# Patient Record
Sex: FEMALE | Race: WHITE
[De-identification: names, ages, dates, MRNs, and addresses within clinical notes are randomized per-mention and may not be internally consistent; named-entity substitution may affect disease eponyms.]

---

## 2019-10-02 ENCOUNTER — HOSPITAL ENCOUNTER (INPATIENT)
Dept: HOSPITAL 11 - JP.ED | Age: 64
LOS: 4 days | Discharge: HOME | DRG: 639 | End: 2019-10-06
Attending: INTERNAL MEDICINE | Admitting: FAMILY MEDICINE
Payer: MEDICARE

## 2019-10-02 DIAGNOSIS — R11.2: ICD-10-CM

## 2019-10-02 DIAGNOSIS — R53.1: ICD-10-CM

## 2019-10-02 DIAGNOSIS — E86.0: ICD-10-CM

## 2019-10-02 DIAGNOSIS — E11.10: Primary | ICD-10-CM

## 2019-10-02 DIAGNOSIS — R06.02: ICD-10-CM

## 2019-10-02 DIAGNOSIS — A08.4: ICD-10-CM

## 2019-10-02 DIAGNOSIS — N28.9: ICD-10-CM

## 2019-10-02 DIAGNOSIS — Z98.49: ICD-10-CM

## 2019-10-02 DIAGNOSIS — F41.9: ICD-10-CM

## 2019-10-02 DIAGNOSIS — Z79.4: ICD-10-CM

## 2019-10-02 PROCEDURE — 93005 ELECTROCARDIOGRAM TRACING: CPT

## 2019-10-02 PROCEDURE — 96361 HYDRATE IV INFUSION ADD-ON: CPT

## 2019-10-02 PROCEDURE — 96374 THER/PROPH/DIAG INJ IV PUSH: CPT

## 2019-10-02 PROCEDURE — 80053 COMPREHEN METABOLIC PANEL: CPT

## 2019-10-02 PROCEDURE — C9113 INJ PANTOPRAZOLE SODIUM, VIA: HCPCS

## 2019-10-02 PROCEDURE — 82800 BLOOD PH: CPT

## 2019-10-02 PROCEDURE — 96375 TX/PRO/DX INJ NEW DRUG ADDON: CPT

## 2019-10-02 PROCEDURE — 83605 ASSAY OF LACTIC ACID: CPT

## 2019-10-02 PROCEDURE — 99285 EMERGENCY DEPT VISIT HI MDM: CPT

## 2019-10-02 PROCEDURE — 85025 COMPLETE CBC W/AUTO DIFF WBC: CPT

## 2019-10-02 PROCEDURE — 36415 COLL VENOUS BLD VENIPUNCTURE: CPT

## 2019-10-02 PROCEDURE — 81001 URINALYSIS AUTO W/SCOPE: CPT

## 2019-10-02 PROCEDURE — 84484 ASSAY OF TROPONIN QUANT: CPT

## 2019-10-02 NOTE — EDM.PDOC
ED HPI GENERAL MEDICAL PROBLEM





- General


Chief Complaint: Gastrointestinal Problem


Stated Complaint: VOMTING, WEAK


Time Seen by Provider: 10/02/19 21:38


Source of Information: Reports: Patient


History Limitations: Reports: No Limitations





- History of Present Illness


INITIAL COMMENTS - FREE TEXT/NARRATIVE: 





pt arrived with a history of vomiting continuously for the pat 24 hours. She is 

very weak and shakey. She is having alot of anxiety. She did not have any loose 

stools. She did have a bm last nite that was normal colored. She is thinking 

she may have eaten something thaat caused the vomiting. She did go out to eat 

last nite. She had a popover, chicken and a salad. 


Onset: Other ( started last nite and it was continuous during the nite. )


Duration: Hour(s):


Location: Reports: Abdomen, Other ( she is not having abdomanal pain. )


Associated Symptoms: Reports: Nausea/Vomiting, Other (pt is very hyper and is 

shakey. )





- Related Data


 Allergies











Allergy/AdvReac Type Severity Reaction Status Date / Time


 


No Known Allergies Allergy   Verified 10/02/19 21:14











Home Meds: 


 Home Meds





NK [No Known Home Meds]  10/02/19 [History]











Past Medical History


HEENT History: Reports: Cataract


OB/GYN History: Reports: Pregnancy


Endocrine/Metabolic History: Reports: Other (See Below)


Other Endocrine/Metabolic History: "high blood sugar when gets sick."





- Past Surgical History


HEENT Surgical History: Reports: Cataract Surgery





Social & Family History





- Tobacco Use


Smoking Status *Q: Never Smoker





- Caffeine Use


Caffeine Use: Reports: None





- Recreational Drug Use


Recreational Drug Use: No





ED ROS GENERAL





- Review of Systems


Review Of Systems: See Below


Constitutional: Reports: Weakness, Fatigue, Diaphoresis, Other (pt has been 

vomiting)


HEENT: Reports: No Symptoms


Respiratory: Reports: Shortness of Breath, Other (pt is feeling very anxious. )


Cardiovascular: Reports: No Symptoms


Endocrine: Reports: No Symptoms


GI/Abdominal: Reports: Other (pt does not have abdomanal pain. )


: Reports: No Symptoms


Musculoskeletal: Reports: Hand Pain


Skin: Reports: No Symptoms


Neurological: Reports: No Symptoms, Other (pt is very shakey and is anxious. )





ED EXAM, GI/ABD





- Physical Exam


Exam: See Below


Text/Narrative:: 





pt arrived with a history of being very weak. She has vomited since last pm.  

She did have some abdomanal cramping at first but is not having pain  at this 

time. pt is very shakey and is feeling very weak. 


Exam Limited By: No Limitations


General Appearance: Alert, Anxious, Moderate Distress, Other (pt is shakey and 

is feeling terrible. )


Ears: Normal TMs


Nose: Normal Inspection


Throat/Mouth: Normal Inspection


Head: Atraumatic


Neck: Normal Inspection


Respiratory/Chest: No Respiratory Distress, Other (pt does have resp of 24. )


Cardiovascular: Regular Rate, Rhythm, Tachycardia


GI/Abdominal Exam: Soft, Non-Tender


 (Female) Exam: Deferred


Rectal (Female) Exam: Deferred


Back Exam: Normal Inspection


Extremities: Normal Inspection


Neurological: Alert, Oriented, Normal Cognition, Other ( very anxious 

appearing. )


Psychiatric: Anxious





Course





- Vital Signs


Last Recorded V/S: 


 Last Vital Signs











Temp  36.6 C   10/05/19 15:40


 


Pulse  76   10/05/19 15:40


 


Resp  16   10/05/19 15:40


 


BP  142/68 H  10/05/19 15:40


 


Pulse Ox  95   10/05/19 15:40














- Orders/Labs/Meds


Orders: 


 Medication Orders





Dextrose (Glutose 15)  15 gm PO ONETIME PRN


   PRN Reason: Hypoglycemia


Dextrose/Water (Dextrose 50% In Water)  50 ml IV ONETIME PRN


   PRN Reason: Hypoglycemia


Enoxaparin Sodium (Lovenox)  40 mg SUBCUT DAILY [facility]


   Last Admin: 10/05/19 09:24  Dose: 40 mg


   Admin: 10/04/19 08:16  Dose: 40 mg


   Admin: 10/03/19 10:46  Dose: 40 mg


Ceftriaxone Sodium 1 gm/ (Sodium Chloride)  50 mls @ 100 mls/hr IV Q24H [facility]


   Last Admin: 10/05/19 08:40  Dose: 100 mls/hr


Insulin Glargine (Lantus Solostar)  12 units SUBCUT BEDTIME [facility]


Insulin Human Lispro (Humalog)  0 unit SUBCUT QIDACANDBED [facility]; Protocol


Lactobacillus Rhamnosus (Culturelle)  1 cap PO BID [facility]


   Last Admin: 10/05/19 09:24  Dose: 1 cap


Lorazepam (Ativan)  0.5 mg IVPUSH Q2H PRN


   PRN Reason: Nausea


   Last Admin: 10/05/19 14:21  Dose: 0.5 mg


   Admin: 10/05/19 05:37  Dose: 0.5 mg


   Admin: 10/04/19 17:02  Dose: 0.5 mg


   Admin: 10/04/19 14:36  Dose: 0.5 mg


   Admin: 10/04/19 09:04  Dose: 0.5 mg


   Admin: 10/03/19 12:58  Dose: 0.5 mg


Metformin HCl (Glucophage)  1,000 mg PO BIDMEALS [facility]


   Last Admin: 10/05/19 17:50  Dose: 1,000 mg


Ondansetron HCl (Zofran)  4 mg IVPUSH Q4H PRN


   PRN Reason: Nausea/Vomiting


   Last Admin: 10/05/19 11:25  Dose: 4 mg


   Admin: 10/05/19 03:43  Dose: 4 mg


   Admin: 10/04/19 13:35  Dose: 4 mg


   Admin: 10/04/19 08:41  Dose: 4 mg


   Admin: 10/03/19 20:09  Dose: 4 mg


   Admin: 10/03/19 11:12  Dose: 4 mg


Pantoprazole Sodium (Protonix***)  40 mg PO BIDAC [facility]


   Last Admin: 10/05/19 17:50  Dose: 40 mg


   Admin: 10/05/19 08:22  Dose:  


   Admin: 10/04/19 16:25 Dose:  Not Given


   Admin: 10/04/19 08:15  Dose: 40 mg


   Admin: 10/03/19 16:30  Dose: 40 mg


Sucralfate (Carafate)  1 gm PO QIDACANDBED [facility]


   Last Admin: 10/05/19 17:49  Dose: 1 gm


Tamsulosin HCl (Flomax)  0.4 mg PO PCBREAKFAST [facility]


   Last Admin: 10/05/19 09:54  Dose: 0.4 mg








Labs: 


 Laboratory Tests











  10/02/19 10/02/19 10/02/19 Range/Units





  21:24 21:24 22:03 


 


WBC  14.2 H    (4.5-11.0)  K/uL


 


RBC  4.48    (3.30-5.50)  M/uL


 


Hgb  13.8    (12.0-15.0)  g/dL


 


Hct  42.3    (36.0-48.0)  %


 


MCV  94    (80-98)  fL


 


MCH  31    (27-31)  pg


 


MCHC  33    (32-36)  %


 


Plt Count  250    (150-400)  K/uL


 


Neut % (Auto)  90 H    (36-66)  %


 


Lymph % (Auto)  7 L    (24-44)  %


 


Mono % (Auto)  3    (2-6)  %


 


Eos % (Auto)  0 L    (2-4)  %


 


Baso % (Auto)  0    (0-1)  %


 


VBG pH     (7.350-7.450)  


 


Sodium   128 L   (140-148)  mmol/L


 


Potassium   4.9   (3.6-5.2)  mmol/L


 


Chloride   89 L   (100-108)  mmol/L


 


Carbon Dioxide   13 L   (21-32)  mmol/L


 


Anion Gap   30.9 H   (5.0-14.0)  mmol/L


 


BUN   51 H   (7-18)  mg/dL


 


Creatinine   1.3 H   (0.6-1.0)  mg/dL


 


Est Cr Clr Drug Dosing   43.25   mL/min


 


Estimated GFR (MDRD)   41 L   (>60)  


 


Glucose   797 H*   ()  mg/dL


 


Lactic Acid     (0.4-2.0)  mmol/L


 


Calcium   9.9   (8.5-10.1)  mg/dL


 


Total Bilirubin   0.9   (0.2-1.0)  mg/dL


 


AST   8 L   (15-37)  U/L


 


ALT   14   (12-78)  U/L


 


Alkaline Phosphatase   94   ()  U/L


 


Troponin I    < 0.017  (0.000-0.056)  ng/mL


 


Total Protein   7.8   (6.4-8.2)  g/dL


 


Albumin   3.5   (3.4-5.0)  g/dL


 


Globulin   4.3 H   (2.3-3.5)  g/dL


 


Albumin/Globulin Ratio   0.8 L   (1.2-2.2)  


 


Urine Color     (YELLOW)  


 


Urine Appearance     (CLEAR)  


 


Urine pH     (5.0-8.0)  


 


Ur Specific Gravity     (1.008-1.030)  


 


Urine Protein     (NEGATIVE)  mg/dL


 


Urine Glucose (UA)     (NEGATIVE)  mg/dL


 


Urine Ketones     (NEGATIVE)  mg/dL


 


Urine Occult Blood     (NEGATIVE)  


 


Urine Nitrite     (NEGATIVE)  


 


Urine Bilirubin     (NEGATIVE)  


 


Urine Urobilinogen     (0.2-1.0)  EU/dL


 


Ur Leukocyte Esterase     (NEGATIVE)  


 


Urine RBC     (0-5)  


 


Urine WBC     (0-5)  


 


Ur Epithelial Cells     


 


Amorphous Sediment     


 


Urine Bacteria     


 


Urine Mucus     














  10/02/19 10/02/19 10/02/19 Range/Units





  22:08 22:10 22:20 


 


WBC     (4.5-11.0)  K/uL


 


RBC     (3.30-5.50)  M/uL


 


Hgb     (12.0-15.0)  g/dL


 


Hct     (36.0-48.0)  %


 


MCV     (80-98)  fL


 


MCH     (27-31)  pg


 


MCHC     (32-36)  %


 


Plt Count     (150-400)  K/uL


 


Neut % (Auto)     (36-66)  %


 


Lymph % (Auto)     (24-44)  %


 


Mono % (Auto)     (2-6)  %


 


Eos % (Auto)     (2-4)  %


 


Baso % (Auto)     (0-1)  %


 


VBG pH   7.236 L   (7.350-7.450)  


 


Sodium     (140-148)  mmol/L


 


Potassium     (3.6-5.2)  mmol/L


 


Chloride     (100-108)  mmol/L


 


Carbon Dioxide     (21-32)  mmol/L


 


Anion Gap     (5.0-14.0)  mmol/L


 


BUN     (7-18)  mg/dL


 


Creatinine     (0.6-1.0)  mg/dL


 


Est Cr Clr Drug Dosing     mL/min


 


Estimated GFR (MDRD)     (>60)  


 


Glucose     ()  mg/dL


 


Lactic Acid    3.6 H  (0.4-2.0)  mmol/L


 


Calcium     (8.5-10.1)  mg/dL


 


Total Bilirubin     (0.2-1.0)  mg/dL


 


AST     (15-37)  U/L


 


ALT     (12-78)  U/L


 


Alkaline Phosphatase     ()  U/L


 


Troponin I     (0.000-0.056)  ng/mL


 


Total Protein     (6.4-8.2)  g/dL


 


Albumin     (3.4-5.0)  g/dL


 


Globulin     (2.3-3.5)  g/dL


 


Albumin/Globulin Ratio     (1.2-2.2)  


 


Urine Color  Yellow    (YELLOW)  


 


Urine Appearance  Cloudy A    (CLEAR)  


 


Urine pH  5.0    (5.0-8.0)  


 


Ur Specific Gravity  1.010    (1.008-1.030)  


 


Urine Protein  Negative    (NEGATIVE)  mg/dL


 


Urine Glucose (UA)  500 H    (NEGATIVE)  mg/dL


 


Urine Ketones  80 H    (NEGATIVE)  mg/dL


 


Urine Occult Blood  Small H    (NEGATIVE)  


 


Urine Nitrite  Negative    (NEGATIVE)  


 


Urine Bilirubin  Small H    (NEGATIVE)  


 


Urine Urobilinogen  0.2    (0.2-1.0)  EU/dL


 


Ur Leukocyte Esterase  Negative    (NEGATIVE)  


 


Urine RBC  0-5    (0-5)  


 


Urine WBC  0-5    (0-5)  


 


Ur Epithelial Cells  Moderate    


 


Amorphous Sediment  Few    


 


Urine Bacteria  Few    


 


Urine Mucus  Few    











Meds: 


Medications











Generic Name Dose Route Start Last Admin





  Trade Name Freq  PRN Reason Stop Dose Admin


 


Dextrose  15 gm  10/03/19 08:50  





  Glutose 15  PO   





  ONETIME PRN   





  Hypoglycemia   





     





     





     


 


Dextrose/Water  50 ml  10/03/19 08:50  





  Dextrose 50% In Water  IV   





  ONETIME PRN   





  Hypoglycemia   





     





     





     


 


Enoxaparin Sodium  40 mg  10/03/19 10:00  10/05/19 09:24





  Lovenox  SUBCUT   40 mg





  DAILY IES[facility]   Administration





     





     





     





     


 


Ceftriaxone Sodium 1 gm/  50 mls @ 100 mls/hr  10/05/19 09:00  10/05/19 08:40





  Sodium Chloride  IV   100 mls/hr





  Q24H [facility]   Administration





     





     





     





     


 


Insulin Glargine  12 units  10/05/19 21:00  





  Lantus Solostar  SUBCUT   





  BEDTIME [facility]   





     





     





     





     


 


Insulin Human Lispro  0 unit  10/05/19 17:00  





  Humalog  SUBCUT   





  QIDACANDBED [facility]   





     





     





  Protocol   





     


 


Lactobacillus Rhamnosus  1 cap  10/05/19 09:00  10/05/19 09:24





  Culturelle  PO   1 cap





  BID IESHA   Administration





     





     





     





     


 


Lorazepam  0.5 mg  10/03/19 12:50  10/05/19 14:21





  Ativan  IVPUSH   0.5 mg





  Q2H PRN   Administration





  Nausea   





     





     





     


 


Metformin HCl  1,000 mg  10/05/19 17:00  10/05/19 17:50





  Glucophage  PO   1,000 mg





  BIDMEALS IESHA   Administration





     





     





     





     


 


Ondansetron HCl  4 mg  10/03/19 10:58  10/05/19 11:25





  Zofran  IVPUSH   4 mg





  Q4H PRN   Administration





  Nausea/Vomiting   





     





     





     


 


Pantoprazole Sodium  40 mg  10/03/19 16:30  10/05/19 17:50





  Protonix***  PO   40 mg





  BIDAC IESHA   Administration





     





     





     





     


 


Sucralfate  1 gm  10/05/19 17:00  10/05/19 17:49





  Carafate  PO   1 gm





  QIDACANDBED I[facility]   Administration





     





     





     





     


 


Tamsulosin HCl  0.4 mg  10/05/19 09:00  10/05/19 09:54





  Flomax  PO   0.4 mg





  PCBREAKFAST I[facility]   Administration





     





     





     





     














Discontinued Medications














Generic Name Dose Route Start Last Admin





  Trade Name Shira  PRN Reason Stop Dose Admin


 


Sodium Chloride  1,000 mls @ 999 mls/hr  10/02/19 21:45  10/03/19 04:02





  Normal Saline  IV   999 mls/hr





  ASDIRECTED IESHA   Administration





     





     





     





     


 


Insulin Human Regular 100 unit  100 mls @ 6.35 mls/hr  10/02/19 22:15  10/03/19 

05:34





  / Sodium Chloride  IV   Infused





  TITRATE IESHA   Titration





     





     





  Protocol   





  0.1 UNITS/KG/HR   


 


Sodium Chloride  1,000 mls @ 999 mls/hr  10/02/19 22:15  10/02/19 23:06





  Normal Saline  IV   999 mls/hr





  ASDIRECTED IESHA   Administration





     





     





     





     


 


Dextrose/Sodium Chloride  1,000 mls @ 100 mls/hr  10/03/19 06:15  10/03/19 15:04





  Dextrose 5%-Normal Saline  IV   100 mls/hr





  ASDIRECTED IESHA   Administration





     





     





     





     


 


Sodium Chloride  1,000 mls @ 150 mls/hr  10/03/19 18:15  10/04/19 08:46





  Normal Saline  IV   150 mls/hr





  ASDIRECTED IESHA   Administration





     





     





     





     


 


Sodium Chloride  1,000 mls @ 50 mls/hr  10/04/19 10:30  10/05/19 00:59





  Normal Saline  IV   50 mls/hr





  ASDIRECTED IESHA   Administration





     





     





     





     


 


Sodium Chloride  74 mls @ 3 mls/sec  10/04/19 10:45  10/04/19 10:53





  Normal Saline  IV  10/04/19 10:46  3 mls/sec





  ASDIRECTED IESHA   Administration





     





     





     





     


 


Potassium Chloride 20 meq/  0 mls @ 50 mls/hr  10/05/19 06:30  10/05/19 06:23





  Premix  IV  10/05/19 08:15  50 mls/hr





  Q2H IESHA   Administration





     





     





     





     


 


Potassium Chloride 20 meq/  112 mls @ 56 mls/hr  10/05/19 08:30  10/05/19 09:06





Lidocaine HCl 2 ml/ Sodium  IV  10/05/19 10:29  56 mls/hr





Chloride  ONETIME ONE   Administration





     





     





     





     


 


Insulin Glargine  20 units  10/03/19 21:00  10/04/19 21:38





  Lantus Solostar  SUBCUT   20 units





  BEDTIME [facility]   Administration





     





     





     





     


 


Insulin Human Lispro  0 unit  10/03/19 11:00  10/03/19 16:43





  Humalog  SUBCUT   6 units





  QIDACANDBED I[facility]HA   Administration





     





     





  Protocol   





     


 


Insulin Human Lispro  0 unit  10/03/19 18:00  10/05/19 14:46





  Humalog  SUBCUT   Not Given





  Q4H [facility]   





     





     





  Protocol   





     


 


Insulin Human Regular  4 unit  10/02/19 22:11  10/02/19 22:38





  Humulin R  IVPUSH  10/02/19 22:12  4 units





  ONETIME ONE   Administration





     





     





     





     


 


Iopamidol  100 ml  10/04/19 10:31  10/04/19 10:52





  Isovue-300 (61%)  IV  10/05/19 10:32  100 ml





  .AS DIRECTED PRN   Administration





  RADIOLOGY EXAM   





     





     





     


 


Lidocaine HCl  5 ml  10/05/19 06:08  10/05/19 06:23





  Xylocaine-Mpf 1%  INJECT  10/05/19 06:09  5 ml





  ONETIME ONE   Administration





     





     





     





     


 


Lorazepam  0.5 mg  10/02/19 21:42  10/02/19 22:01





  Ativan  IVPUSH  10/02/19 21:43  0.5 mg





  ONETIME ONE   Administration





     





     





     





     


 


Lorazepam  0.5 mg  10/04/19 10:09  10/04/19 10:16





  Ativan  IVPUSH  10/04/19 10:10  0.5 mg





  ONETIME ONE   Administration





     





     





     





     


 


Metformin HCl  1,000 mg  10/03/19 09:30  10/04/19 08:16





  Glucophage  PO   1,000 mg





  BIDMEALS IESHA   Administration





     





     





     





     


 


Ondansetron HCl  4 mg  10/02/19 21:37  10/02/19 22:05





  Zofran  IVPUSH  10/02/19 21:38  4 mg





  ONETIME ONE   Administration





     





     





     





     


 


Pantoprazole Sodium  40 mg  10/02/19 22:31  10/02/19 22:54





  Protonix Iv***  IVPUSH  10/02/19 22:32  40 mg





  ONETIME ONE   Administration





     





     





     





     


 


Pantoprazole Sodium  40 mg  10/02/19 23:00  10/02/19 23:21





  Protonix Iv***  IV   Not Given





  Q12H [facility]   





     





     





     





     


 


Potassium Chloride  40 meq  10/03/19 05:08  10/03/19 05:25





  Klor-Con M20  PO  10/03/19 05:09  40 meq





  ONETIME ONE   Administration





     





     





     





     


 


Potassium Chloride  40 meq  10/04/19 10:00  10/04/19 11:14





  Klor-Con M20  PO  10/04/19 10:01  40 meq





  ONETIME ONE   Administration





     





     





     





     














- Re-Assessments/Exams


Free Text/Narrative Re-Assessment/Exam: 





10/02/19 22:30


pt has a bs of 797 and she has a co2 of 12.  Her bun and creatnine are 

elevated. Her emesis was dark looking according to her. b





Departure





- Departure


Time of Disposition: 22:32


Disposition: Admitted As Inpatient 65


Condition: Fair


Clinical Impression: 


 Dehydration, Renal insufficiency





Diabetic keto-acidosis


Qualifiers:


 Diabetes mellitus type: type 2 Diabetes mellitus complication detail: without 

coma Qualified Code(s): E11.10 - Type 2 diabetes mellitus with ketoacidosis 

without coma








- Discharge Information

## 2019-10-02 NOTE — PCM.HP.2
H&P History of Present Illness





- General


Date of Service: 10/02/19


Admit Problem/Dx: 


 Admission Diagnosis/Problem





Admission Diagnosis/Problem      Ketoacidosis in diabetes mellitus








Source of Information: Patient, Family


History Limitations: Reports: No Limitations





- History of Present Illness


Initial Comments - Free Text/Narative: 





Patient is a 63yo female with PMH of DMII that she controls normally with diet 

and exercise. She says she has been on metformin in the past but 'weans herself 

off' over time. She says she normally has blood sugars in the 110-120 range but 

when she gets sick her BG rises. Her  and daughter believe that her BG 

rises and then she gets sick, but she disputes that her BG was high. She says 

she has no other medical problems and denies that she should be on medication 

for anything other than diabetes. She says she started having weakness and 

fatigue probably 3-5  days ago and that it worsened acutely yesterday with 

nausea and vomiting that started after going out for dinner yesterday. She says 

she started to be able to keep water down today but was feeling so ill her 

family brought her to the ER. She was found to have DKA in the ED


Onset of Symptoms: Reports: Gradual


Duration of Symptoms: Reports: Day(s):, Getting Worse


Location: Reports: Generalized





- Related Data


Allergies/Adverse Reactions: 


 Allergies











Allergy/AdvReac Type Severity Reaction Status Date / Time


 


No Known Allergies Allergy   Verified 10/02/19 21:14











Home Medications: 


 Home Meds





NK [No Known Home Meds]  10/02/19 [History]











Past Medical History


HEENT History: Reports: Cataract


OB/GYN History: Reports: Pregnancy


Endocrine/Metabolic History: Reports: Diabetes, Type II, Other (See Below)


Other Endocrine/Metabolic History: "high blood sugar when gets sick."





- Past Surgical History


HEENT Surgical History: Reports: Cataract Surgery





Social & Family History





- Family History


Family Medical History: Noncontributory





- Tobacco Use


Smoking Status *Q: Never Smoker





- Caffeine Use


Caffeine Use: Reports: None





- Recreational Drug Use


Recreational Drug Use: No





H&P Review of Systems





- Review of Systems:


Review Of Systems: See Below


General: Reports: Weakness, Fatigue, Diaphoresis


HEENT: Reports: Headaches


Pulmonary: Reports: No Symptoms


Cardiovascular: Reports: No Symptoms


Gastrointestinal: Reports: Nausea, Vomiting


Genitourinary: Reports: Frequency


Musculoskeletal: Reports: Other (weakness)


Skin: Reports: No Symptoms


Psychiatric: Reports: No Symptoms


Neurological: Reports: Dizziness, Weakness





Exam





- Exam


Exam: See Below





- Vital Signs


Vital Signs: 


 Last Vital Signs











Temp  36.6 C   10/02/19 21:12


 


Pulse  80   10/02/19 21:12


 


Resp  24 H  10/02/19 21:12


 


BP  96/56 L  10/02/19 21:12


 


Pulse Ox  97   10/02/19 21:12











Weight: 63.503 kg





- Exam


General: Alert, Oriented, Cooperative, Mild Distress


HEENT: PERRLA, Conjunctiva Clear


Neck: Supple, Trachea Midline


Lungs: Clear to Auscultation, Normal Respiratory Effort


Cardiovascular: Regular Rate, Regular Rhythm


GI/Abdominal Exam: Normal Bowel Sounds, Soft, Non-Tender, No Organomegaly, No 

Distention, No Abnormal Bruit, No Mass


 (Female) Exam: Deferred


Rectal (Female) Exam: Deferred


Back Exam: Normal Inspection


Extremities: Normal Inspection


Skin: Warm, Dry, Intact


Neurological: Cranial Nerves Intact


Neuro Extensive - Mental Status: Alert, Oriented x3, Normal Mood/Affect, Normal 

Cognition, Memory Intact


Neuro Extensive - Motor, Sensory, Reflexes: CN II-XII Intact, Normal Gait, 

Normal Reflexes


Psychiatric: Alert, Normal Affect, Normal Mood





- Patient Data


Lab Results Last 24 hrs: 


 Laboratory Results - last 24 hr











  10/02/19 10/02/19 10/02/19 Range/Units





  21:24 21:24 22:03 


 


WBC  14.2 H    (4.5-11.0)  K/uL


 


RBC  4.48    (3.30-5.50)  M/uL


 


Hgb  13.8    (12.0-15.0)  g/dL


 


Hct  42.3    (36.0-48.0)  %


 


MCV  94    (80-98)  fL


 


MCH  31    (27-31)  pg


 


MCHC  33    (32-36)  %


 


Plt Count  250    (150-400)  K/uL


 


Neut % (Auto)  90 H    (36-66)  %


 


Lymph % (Auto)  7 L    (24-44)  %


 


Mono % (Auto)  3    (2-6)  %


 


Eos % (Auto)  0 L    (2-4)  %


 


Baso % (Auto)  0    (0-1)  %


 


VBG pH     (7.350-7.450)  


 


Sodium   128 L   (140-148)  mmol/L


 


Potassium   4.9   (3.6-5.2)  mmol/L


 


Chloride   89 L   (100-108)  mmol/L


 


Carbon Dioxide   13 L   (21-32)  mmol/L


 


Anion Gap   30.9 H   (5.0-14.0)  mmol/L


 


BUN   51 H   (7-18)  mg/dL


 


Creatinine   1.3 H   (0.6-1.0)  mg/dL


 


Est Cr Clr Drug Dosing   43.25   mL/min


 


Estimated GFR (MDRD)   41 L   (>60)  


 


Glucose   797 H*   ()  mg/dL


 


Lactic Acid     (0.4-2.0)  mmol/L


 


Calcium   9.9   (8.5-10.1)  mg/dL


 


Total Bilirubin   0.9   (0.2-1.0)  mg/dL


 


AST   8 L   (15-37)  U/L


 


ALT   14   (12-78)  U/L


 


Alkaline Phosphatase   94   ()  U/L


 


Troponin I    < 0.017  (0.000-0.056)  ng/mL


 


Total Protein   7.8   (6.4-8.2)  g/dL


 


Albumin   3.5   (3.4-5.0)  g/dL


 


Globulin   4.3 H   (2.3-3.5)  g/dL


 


Albumin/Globulin Ratio   0.8 L   (1.2-2.2)  


 


Urine Color     (YELLOW)  


 


Urine Appearance     (CLEAR)  


 


Urine pH     (5.0-8.0)  


 


Ur Specific Gravity     (1.008-1.030)  


 


Urine Protein     (NEGATIVE)  mg/dL


 


Urine Glucose (UA)     (NEGATIVE)  mg/dL


 


Urine Ketones     (NEGATIVE)  mg/dL


 


Urine Occult Blood     (NEGATIVE)  


 


Urine Nitrite     (NEGATIVE)  


 


Urine Bilirubin     (NEGATIVE)  


 


Urine Urobilinogen     (0.2-1.0)  EU/dL


 


Ur Leukocyte Esterase     (NEGATIVE)  


 


Urine RBC     (0-5)  


 


Urine WBC     (0-5)  


 


Ur Epithelial Cells     


 


Amorphous Sediment     


 


Urine Bacteria     


 


Urine Mucus     














  10/02/19 10/02/19 10/02/19 Range/Units





  22:08 22:10 22:20 


 


WBC     (4.5-11.0)  K/uL


 


RBC     (3.30-5.50)  M/uL


 


Hgb     (12.0-15.0)  g/dL


 


Hct     (36.0-48.0)  %


 


MCV     (80-98)  fL


 


MCH     (27-31)  pg


 


MCHC     (32-36)  %


 


Plt Count     (150-400)  K/uL


 


Neut % (Auto)     (36-66)  %


 


Lymph % (Auto)     (24-44)  %


 


Mono % (Auto)     (2-6)  %


 


Eos % (Auto)     (2-4)  %


 


Baso % (Auto)     (0-1)  %


 


VBG pH   7.236 L   (7.350-7.450)  


 


Sodium     (140-148)  mmol/L


 


Potassium     (3.6-5.2)  mmol/L


 


Chloride     (100-108)  mmol/L


 


Carbon Dioxide     (21-32)  mmol/L


 


Anion Gap     (5.0-14.0)  mmol/L


 


BUN     (7-18)  mg/dL


 


Creatinine     (0.6-1.0)  mg/dL


 


Est Cr Clr Drug Dosing     mL/min


 


Estimated GFR (MDRD)     (>60)  


 


Glucose     ()  mg/dL


 


Lactic Acid    3.6 H  (0.4-2.0)  mmol/L


 


Calcium     (8.5-10.1)  mg/dL


 


Total Bilirubin     (0.2-1.0)  mg/dL


 


AST     (15-37)  U/L


 


ALT     (12-78)  U/L


 


Alkaline Phosphatase     ()  U/L


 


Troponin I     (0.000-0.056)  ng/mL


 


Total Protein     (6.4-8.2)  g/dL


 


Albumin     (3.4-5.0)  g/dL


 


Globulin     (2.3-3.5)  g/dL


 


Albumin/Globulin Ratio     (1.2-2.2)  


 


Urine Color  Yellow    (YELLOW)  


 


Urine Appearance  Cloudy A    (CLEAR)  


 


Urine pH  5.0    (5.0-8.0)  


 


Ur Specific Gravity  1.010    (1.008-1.030)  


 


Urine Protein  Negative    (NEGATIVE)  mg/dL


 


Urine Glucose (UA)  500 H    (NEGATIVE)  mg/dL


 


Urine Ketones  80 H    (NEGATIVE)  mg/dL


 


Urine Occult Blood  Small H    (NEGATIVE)  


 


Urine Nitrite  Negative    (NEGATIVE)  


 


Urine Bilirubin  Small H    (NEGATIVE)  


 


Urine Urobilinogen  0.2    (0.2-1.0)  EU/dL


 


Ur Leukocyte Esterase  Negative    (NEGATIVE)  


 


Urine RBC  0-5    (0-5)  


 


Urine WBC  0-5    (0-5)  


 


Ur Epithelial Cells  Moderate    


 


Amorphous Sediment  Few    


 


Urine Bacteria  Few    


 


Urine Mucus  Few    











Result Diagrams: 


 10/02/19 21:24





 10/02/19 21:24





EKG INTERPRETATION


EKG Date: 10/02/19


Time: 22:28


Rhythm: NSR


Rate (Beats/Min): 81


Axis: Normal


P-Wave: Present


QRS: Normal


ST-T: Normal


QT: Normal


Comparison: NA - No Prior EKG


EKG Interpretation Comments: 





Normal EKG 





*Q Meaningful Use (ADM)





- VTE Risk Assess *Q


Each Risk Factor Represents 1 Point: None


Total Score 1 Point Risk Factors: 0


Each Risk Factor Represents 2 Points: Age 60 - 74 Years


Total Score 2 Point Risk Factors: 2





- Problem List


(1) Diabetic keto-acidosis


SNOMED Code(s): 100863240, 859471753


   ICD Code: E11.10 - TYPE 2 DIABETES MELLITUS WITH KETOACIDOSIS WITHOUT COMA   

Status: Acute   Current Visit: Yes   Onset Date: ~10/01/19   


Qualifiers: 


   Diabetes mellitus type: type 2   Diabetes mellitus complication detail: 

without coma   Qualified Code(s): E11.10 - Type 2 diabetes mellitus with 

ketoacidosis without coma   





(2) Dehydration


SNOMED Code(s): 22775453


   ICD Code: E86.0 - DEHYDRATION   Status: Acute   Current Visit: Yes   Onset 

Date: ~10/01/19   





(3) Renal insufficiency


SNOMED Code(s): 827118482, 868329836


   ICD Code: N28.9 - DISORDER OF KIDNEY AND URETER, UNSPECIFIED   Status: Acute

   Current Visit: Yes   Onset Date: ~10/01/19   


Problem List Initiated/Reviewed/Updated: Yes


Orders Last 24hrs: 


 Active Orders 24 hr











 Category Date Time Status


 


 Patient Status Manage Transfer [TRANSFER] Routine ADT  10/02/19 23:03 Active


 


 Patient Status [ADT] Routine ADT  10/02/19 22:54 Active


 


 Cardiac Monitoring [RC] CONTINUOUS Care  10/02/19 22:54 Active


 


 EKG Documentation Completion [RC] ASDIRECTED Care  10/02/19 22:03 Active


 


 Intake and Output [RC] QSHIFT Care  10/02/19 22:54 Active


 


 May Shower [RC] ASDIRECTED Care  10/02/19 22:53 Active


 


 Oxygen Therapy [RC] PRN Care  10/02/19 22:54 Active


 


 Up With Assistance [RC] ASDIRECTED Care  10/02/19 22:53 Active


 


 VTE/DVT Education [RC] Per Unit Routine Care  10/02/19 22:54 Active


 


 Vital Signs [RC] Q4H Care  10/02/19 22:54 Active


 


 Insulin Regular, Human [HumuLIN R] 100 unit Med  10/02/19 22:15 Active





 Sodium Chloride 0.9% [Normal Saline] 99 ml   





 IV TITRATE   


 


 Pantoprazole [ProTONIX IV***] Med  10/02/19 23:00 Ordered





 40 mg IV Q12H   


 


 Sodium Chloride 0.9% [Normal Saline] 1,000 ml Med  10/02/19 21:45 Active





 IV ASDIRECTED   


 


 Sodium Chloride 0.9% [Normal Saline] 1,000 ml Med  10/02/19 22:15 Active





 IV ASDIRECTED   


 


 Sequential Compression Device [OM.PC] Per Unit Routine Oth  10/02/19 22:55 

Ordered


 


 Resuscitation Status Routine Resus Stat  10/02/19 22:53 Ordered


 


 EKG 12 Lead [EK] Routine Ther  10/02/19 22:03 Ordered








 Medication Orders





Sodium Chloride (Normal Saline)  1,000 mls @ 999 mls/hr IV ASDIRECTED IESHA


   Last Admin: 10/02/19 21:59  Dose: 999 mls/hr


Insulin Human Regular 100 unit (/ Sodium Chloride)  100 mls @ 6.35 mls/hr IV 

TITRATE IESHA; Protocol


   Last Admin: 10/02/19 23:01  Dose: 0.1 units/kg/hr, 6.35 mls/hr


Sodium Chloride (Normal Saline)  1,000 mls @ 999 mls/hr IV ASDIRECTED IESHA


   Last Admin: 10/02/19 23:06  Dose: 999 mls/hr


Pantoprazole Sodium (Protonix Iv***)  40 mg IV Q12H IESHA








Assessment/Plan Comment:: 





Will titrate BG down to 250, will repeat BG every 1-2hrs, will repeat lactate, 

BMP, VBG in 3-4hrs. Once blood glucose reaches 250 stop drip, and transition to 

SQ insulin per sliding scale, and switch NS to 1/2NS D5 until patient is able 

to take PO with BG 1-4hr as needed per protocol. 





- Mortality Measure


Prognosis:: Good

## 2019-10-03 LAB — HBA1C BLD-MCNC: 14.8 % (ref 4.5–6.2)

## 2019-10-03 RX ADMIN — ONDANSETRON PRN MG: 2 INJECTION, SOLUTION INTRAMUSCULAR; INTRAVENOUS at 11:12

## 2019-10-03 RX ADMIN — INSULIN GLARGINE SCH UNITS: 100 INJECTION, SOLUTION SUBCUTANEOUS at 21:47

## 2019-10-03 RX ADMIN — INSULIN LISPRO SCH UNITS: 100 INJECTION, SOLUTION INTRAVENOUS; SUBCUTANEOUS at 16:43

## 2019-10-03 RX ADMIN — INSULIN LISPRO SCH: 100 INJECTION, SOLUTION INTRAVENOUS; SUBCUTANEOUS at 19:28

## 2019-10-03 RX ADMIN — INSULIN LISPRO SCH UNITS: 100 INJECTION, SOLUTION INTRAVENOUS; SUBCUTANEOUS at 11:44

## 2019-10-03 RX ADMIN — ONDANSETRON PRN MG: 2 INJECTION, SOLUTION INTRAMUSCULAR; INTRAVENOUS at 20:09

## 2019-10-03 RX ADMIN — INSULIN LISPRO SCH UNITS: 100 INJECTION, SOLUTION INTRAVENOUS; SUBCUTANEOUS at 21:48

## 2019-10-03 RX ADMIN — LORAZEPAM PRN MG: 2 INJECTION INTRAMUSCULAR; INTRAVENOUS at 12:58

## 2019-10-03 NOTE — PCM.PN
- General Info


Date of Service: 10/03/19


Subjective Update: 





Ms. Nogueira is a 64-year-old woman who was admitted through the emergency 

department last night by Dr. Garnett. She presented with nausea, vomiting, 

weakness, secondary to marked hyperglycemia and diabetic ketoacidosis. She has 

a long-standing history of type 2 diabetes mellitus with poor control. 

Previously treated with metformin and insulin, which she has not taken for some 

time. HGB A1c this morning found to be markedly elevated at 14.8. She is been 

treated with IV insulin through the night, anion gap has almost normalized and 

glucose levels are under good control. She feels significantly improved with no 

further nausea or vomiting.


Functional Status: Reports: Tolerating Diet, Urinating





- Review of Systems


General: Reports: Weakness.  Denies: Fever, Chills


Pulmonary: Reports: No Symptoms


Cardiovascular: Reports: No Symptoms


Gastrointestinal: Reports: No Symptoms





- Patient Data


Vitals - Most Recent: 


 Last Vital Signs











Temp  97.8 F   10/02/19 21:12


 


Pulse  82   10/03/19 02:41


 


Resp  14   10/03/19 02:41


 


BP  91/48 L  10/03/19 02:41


 


Pulse Ox  96   10/03/19 02:41











Weight - Most Recent: 163 lb 15.995 oz


I&O - Last 24 Hours: 


 Intake & Output











 10/02/19 10/03/19 10/03/19





 22:59 06:59 14:59


 


Intake Total  5786 


 


Balance  5786 











Lab Results Last 24 Hours: 


 Laboratory Results - last 24 hr











  10/02/19 10/02/19 10/02/19 Range/Units





  21:24 21:24 22:03 


 


WBC  14.2 H    (4.5-11.0)  K/uL


 


RBC  4.48    (3.30-5.50)  M/uL


 


Hgb  13.8    (12.0-15.0)  g/dL


 


Hct  42.3    (36.0-48.0)  %


 


MCV  94    (80-98)  fL


 


MCH  31    (27-31)  pg


 


MCHC  33    (32-36)  %


 


Plt Count  250    (150-400)  K/uL


 


Neut % (Auto)  90 H    (36-66)  %


 


Lymph % (Auto)  7 L    (24-44)  %


 


Mono % (Auto)  3    (2-6)  %


 


Eos % (Auto)  0 L    (2-4)  %


 


Baso % (Auto)  0    (0-1)  %


 


ABG Hemoglobin     (12.0-16.0)  g/dL


 


ABG Oxyhemoglobin     %


 


ABG Carboxyhemoglobin     (0.0-1.6)  %


 


ABG Methemoglobin     %


 


VBG pH     (7.350-7.450)  


 


VBG pCO2     mm/Hg


 


VBG pO2     mm/Hg


 


VBG HCO3     mmol/L


 


VBG Total CO2     mmol/L


 


VBG O2 Saturation     


 


VBG O2 Content     %vol


 


VBG Base Excess     mm/L


 


O2 Delivery Device     


 


Sodium   128 L   (140-148)  mmol/L


 


Potassium   4.9   (3.6-5.2)  mmol/L


 


Chloride   89 L   (100-108)  mmol/L


 


Carbon Dioxide   13 L   (21-32)  mmol/L


 


Anion Gap   30.9 H   (5.0-14.0)  mmol/L


 


BUN   51 H   (7-18)  mg/dL


 


Creatinine   1.3 H   (0.6-1.0)  mg/dL


 


Est Cr Clr Drug Dosing   43.25   mL/min


 


Estimated GFR (MDRD)   41 L   (>60)  


 


Glucose   797 H*   ()  mg/dL


 


Hemoglobin A1c     (4.5-6.2)  %


 


Lactic Acid     (0.4-2.0)  mmol/L


 


Calcium   9.9   (8.5-10.1)  mg/dL


 


Total Bilirubin   0.9   (0.2-1.0)  mg/dL


 


AST   8 L   (15-37)  U/L


 


ALT   14   (12-78)  U/L


 


Alkaline Phosphatase   94   ()  U/L


 


Troponin I    < 0.017  (0.000-0.056)  ng/mL


 


Total Protein   7.8   (6.4-8.2)  g/dL


 


Albumin   3.5   (3.4-5.0)  g/dL


 


Globulin   4.3 H   (2.3-3.5)  g/dL


 


Albumin/Globulin Ratio   0.8 L   (1.2-2.2)  


 


Urine Color     (YELLOW)  


 


Urine Appearance     (CLEAR)  


 


Urine pH     (5.0-8.0)  


 


Ur Specific Gravity     (1.008-1.030)  


 


Urine Protein     (NEGATIVE)  mg/dL


 


Urine Glucose (UA)     (NEGATIVE)  mg/dL


 


Urine Ketones     (NEGATIVE)  mg/dL


 


Urine Occult Blood     (NEGATIVE)  


 


Urine Nitrite     (NEGATIVE)  


 


Urine Bilirubin     (NEGATIVE)  


 


Urine Urobilinogen     (0.2-1.0)  EU/dL


 


Ur Leukocyte Esterase     (NEGATIVE)  


 


Urine RBC     (0-5)  


 


Urine WBC     (0-5)  


 


Ur Epithelial Cells     


 


Amorphous Sediment     


 


Urine Bacteria     


 


Urine Mucus     














  10/02/19 10/02/19 10/02/19 Range/Units





  22:08 22:10 22:20 


 


WBC     (4.5-11.0)  K/uL


 


RBC     (3.30-5.50)  M/uL


 


Hgb     (12.0-15.0)  g/dL


 


Hct     (36.0-48.0)  %


 


MCV     (80-98)  fL


 


MCH     (27-31)  pg


 


MCHC     (32-36)  %


 


Plt Count     (150-400)  K/uL


 


Neut % (Auto)     (36-66)  %


 


Lymph % (Auto)     (24-44)  %


 


Mono % (Auto)     (2-6)  %


 


Eos % (Auto)     (2-4)  %


 


Baso % (Auto)     (0-1)  %


 


ABG Hemoglobin     (12.0-16.0)  g/dL


 


ABG Oxyhemoglobin     %


 


ABG Carboxyhemoglobin     (0.0-1.6)  %


 


ABG Methemoglobin     %


 


VBG pH   7.236 L   (7.350-7.450)  


 


VBG pCO2     mm/Hg


 


VBG pO2     mm/Hg


 


VBG HCO3     mmol/L


 


VBG Total CO2     mmol/L


 


VBG O2 Saturation     


 


VBG O2 Content     %vol


 


VBG Base Excess     mm/L


 


O2 Delivery Device     


 


Sodium     (140-148)  mmol/L


 


Potassium     (3.6-5.2)  mmol/L


 


Chloride     (100-108)  mmol/L


 


Carbon Dioxide     (21-32)  mmol/L


 


Anion Gap     (5.0-14.0)  mmol/L


 


BUN     (7-18)  mg/dL


 


Creatinine     (0.6-1.0)  mg/dL


 


Est Cr Clr Drug Dosing     mL/min


 


Estimated GFR (MDRD)     (>60)  


 


Glucose     ()  mg/dL


 


Hemoglobin A1c     (4.5-6.2)  %


 


Lactic Acid    3.6 H  (0.4-2.0)  mmol/L


 


Calcium     (8.5-10.1)  mg/dL


 


Total Bilirubin     (0.2-1.0)  mg/dL


 


AST     (15-37)  U/L


 


ALT     (12-78)  U/L


 


Alkaline Phosphatase     ()  U/L


 


Troponin I     (0.000-0.056)  ng/mL


 


Total Protein     (6.4-8.2)  g/dL


 


Albumin     (3.4-5.0)  g/dL


 


Globulin     (2.3-3.5)  g/dL


 


Albumin/Globulin Ratio     (1.2-2.2)  


 


Urine Color  Yellow    (YELLOW)  


 


Urine Appearance  Cloudy A    (CLEAR)  


 


Urine pH  5.0    (5.0-8.0)  


 


Ur Specific Gravity  1.010    (1.008-1.030)  


 


Urine Protein  Negative    (NEGATIVE)  mg/dL


 


Urine Glucose (UA)  500 H    (NEGATIVE)  mg/dL


 


Urine Ketones  80 H    (NEGATIVE)  mg/dL


 


Urine Occult Blood  Small H    (NEGATIVE)  


 


Urine Nitrite  Negative    (NEGATIVE)  


 


Urine Bilirubin  Small H    (NEGATIVE)  


 


Urine Urobilinogen  0.2    (0.2-1.0)  EU/dL


 


Ur Leukocyte Esterase  Negative    (NEGATIVE)  


 


Urine RBC  0-5    (0-5)  


 


Urine WBC  0-5    (0-5)  


 


Ur Epithelial Cells  Moderate    


 


Amorphous Sediment  Few    


 


Urine Bacteria  Few    


 


Urine Mucus  Few    














  10/03/19 10/03/19 10/03/19 Range/Units





  04:21 04:21 04:21 


 


WBC     (4.5-11.0)  K/uL


 


RBC     (3.30-5.50)  M/uL


 


Hgb     (12.0-15.0)  g/dL


 


Hct     (36.0-48.0)  %


 


MCV     (80-98)  fL


 


MCH     (27-31)  pg


 


MCHC     (32-36)  %


 


Plt Count     (150-400)  K/uL


 


Neut % (Auto)     (36-66)  %


 


Lymph % (Auto)     (24-44)  %


 


Mono % (Auto)     (2-6)  %


 


Eos % (Auto)     (2-4)  %


 


Baso % (Auto)     (0-1)  %


 


ABG Hemoglobin  10.6 L    (12.0-16.0)  g/dL


 


ABG Oxyhemoglobin  60.8    %


 


ABG Carboxyhemoglobin  2.1 H    (0.0-1.6)  %


 


ABG Methemoglobin  0.8    %


 


VBG pH  7.335 L    (7.350-7.450)  


 


VBG pCO2  36.5    mm/Hg


 


VBG pO2  35.0    mm/Hg


 


VBG HCO3  19.0    mmol/L


 


VBG Total CO2  17.9    mmol/L


 


VBG O2 Saturation  62.6    


 


VBG O2 Content  9.0    %vol


 


VBG Base Excess  -5.8    mm/L


 


O2 Delivery Device  Room air    


 


Sodium   139 L   (140-148)  mmol/L


 


Potassium   3.3 L   (3.6-5.2)  mmol/L


 


Chloride   109 H   (100-108)  mmol/L


 


Carbon Dioxide   20 L   (21-32)  mmol/L


 


Anion Gap   13.3   (5.0-14.0)  mmol/L


 


BUN   43 H   (7-18)  mg/dL


 


Creatinine   1.0   (0.6-1.0)  mg/dL


 


Est Cr Clr Drug Dosing   56.23   mL/min


 


Estimated GFR (MDRD)   56 L   (>60)  


 


Glucose   238 H   ()  mg/dL


 


Hemoglobin A1c     (4.5-6.2)  %


 


Lactic Acid    3.3 H  (0.4-2.0)  mmol/L


 


Calcium   7.9 L D   (8.5-10.1)  mg/dL


 


Total Bilirubin     (0.2-1.0)  mg/dL


 


AST     (15-37)  U/L


 


ALT     (12-78)  U/L


 


Alkaline Phosphatase     ()  U/L


 


Troponin I     (0.000-0.056)  ng/mL


 


Total Protein     (6.4-8.2)  g/dL


 


Albumin     (3.4-5.0)  g/dL


 


Globulin     (2.3-3.5)  g/dL


 


Albumin/Globulin Ratio     (1.2-2.2)  


 


Urine Color     (YELLOW)  


 


Urine Appearance     (CLEAR)  


 


Urine pH     (5.0-8.0)  


 


Ur Specific Gravity     (1.008-1.030)  


 


Urine Protein     (NEGATIVE)  mg/dL


 


Urine Glucose (UA)     (NEGATIVE)  mg/dL


 


Urine Ketones     (NEGATIVE)  mg/dL


 


Urine Occult Blood     (NEGATIVE)  


 


Urine Nitrite     (NEGATIVE)  


 


Urine Bilirubin     (NEGATIVE)  


 


Urine Urobilinogen     (0.2-1.0)  EU/dL


 


Ur Leukocyte Esterase     (NEGATIVE)  


 


Urine RBC     (0-5)  


 


Urine WBC     (0-5)  


 


Ur Epithelial Cells     


 


Amorphous Sediment     


 


Urine Bacteria     


 


Urine Mucus     














  10/03/19 Range/Units





  06:06 


 


WBC   (4.5-11.0)  K/uL


 


RBC   (3.30-5.50)  M/uL


 


Hgb   (12.0-15.0)  g/dL


 


Hct   (36.0-48.0)  %


 


MCV   (80-98)  fL


 


MCH   (27-31)  pg


 


MCHC   (32-36)  %


 


Plt Count   (150-400)  K/uL


 


Neut % (Auto)   (36-66)  %


 


Lymph % (Auto)   (24-44)  %


 


Mono % (Auto)   (2-6)  %


 


Eos % (Auto)   (2-4)  %


 


Baso % (Auto)   (0-1)  %


 


ABG Hemoglobin   (12.0-16.0)  g/dL


 


ABG Oxyhemoglobin   %


 


ABG Carboxyhemoglobin   (0.0-1.6)  %


 


ABG Methemoglobin   %


 


VBG pH   (7.350-7.450)  


 


VBG pCO2   mm/Hg


 


VBG pO2   mm/Hg


 


VBG HCO3   mmol/L


 


VBG Total CO2   mmol/L


 


VBG O2 Saturation   


 


VBG O2 Content   %vol


 


VBG Base Excess   mm/L


 


O2 Delivery Device   


 


Sodium   (140-148)  mmol/L


 


Potassium   (3.6-5.2)  mmol/L


 


Chloride   (100-108)  mmol/L


 


Carbon Dioxide   (21-32)  mmol/L


 


Anion Gap   (5.0-14.0)  mmol/L


 


BUN   (7-18)  mg/dL


 


Creatinine   (0.6-1.0)  mg/dL


 


Est Cr Clr Drug Dosing   mL/min


 


Estimated GFR (MDRD)   (>60)  


 


Glucose   ()  mg/dL


 


Hemoglobin A1c  14.8 H  (4.5-6.2)  %


 


Lactic Acid   (0.4-2.0)  mmol/L


 


Calcium   (8.5-10.1)  mg/dL


 


Total Bilirubin   (0.2-1.0)  mg/dL


 


AST   (15-37)  U/L


 


ALT   (12-78)  U/L


 


Alkaline Phosphatase   ()  U/L


 


Troponin I   (0.000-0.056)  ng/mL


 


Total Protein   (6.4-8.2)  g/dL


 


Albumin   (3.4-5.0)  g/dL


 


Globulin   (2.3-3.5)  g/dL


 


Albumin/Globulin Ratio   (1.2-2.2)  


 


Urine Color   (YELLOW)  


 


Urine Appearance   (CLEAR)  


 


Urine pH   (5.0-8.0)  


 


Ur Specific Gravity   (1.008-1.030)  


 


Urine Protein   (NEGATIVE)  mg/dL


 


Urine Glucose (UA)   (NEGATIVE)  mg/dL


 


Urine Ketones   (NEGATIVE)  mg/dL


 


Urine Occult Blood   (NEGATIVE)  


 


Urine Nitrite   (NEGATIVE)  


 


Urine Bilirubin   (NEGATIVE)  


 


Urine Urobilinogen   (0.2-1.0)  EU/dL


 


Ur Leukocyte Esterase   (NEGATIVE)  


 


Urine RBC   (0-5)  


 


Urine WBC   (0-5)  


 


Ur Epithelial Cells   


 


Amorphous Sediment   


 


Urine Bacteria   


 


Urine Mucus   











Med Orders - Current: 


 Current Medications





Dextrose (Glutose 15)  15 gm PO ONETIME PRN


   PRN Reason: Hypoglycemia


Dextrose/Water (Dextrose 50% In Water)  50 ml IV ONETIME PRN


   PRN Reason: Hypoglycemia


Dextrose/Sodium Chloride (Dextrose 5%-Normal Saline)  1,000 mls @ 100 mls/hr IV 

ASDIRECTED [facility]


Insulin Human Lispro (Humalog)  0 unit SUBCUT QIDACANDBED [facility]; Protocol


Metformin HCl (Glucophage)  1,000 mg PO BIDMEALS [facility]


   Last Admin: 10/03/19 09:41 Dose:  1,000 mg


Pantoprazole Sodium (Protonix***)  40 mg PO BIDAC [facility]





Discontinued Medications





Sodium Chloride (Normal Saline)  1,000 mls @ 999 mls/hr IV ASDIRECTED [facility]


   Last Admin: 10/03/19 04:02 Dose:  999 mls/hr


Insulin Human Regular 100 unit (/ Sodium Chloride)  100 mls @ 6.35 mls/hr IV 

TITRATE [facility]; Protocol


   Last Titration: 10/03/19 05:34 Dose:  Infused


Sodium Chloride (Normal Saline)  1,000 mls @ 999 mls/hr IV ASDIRECTED [facility]


   Last Admin: 10/02/19 23:06 Dose:  999 mls/hr


Insulin Human Regular (Humulin R)  4 unit IVPUSH ONETIME ONE


   Stop: 10/02/19 22:12


   Last Admin: 10/02/19 22:38 Dose:  4 units


Lorazepam (Ativan)  0.5 mg IVPUSH ONETIME ONE


   Stop: 10/02/19 21:43


   Last Admin: 10/02/19 22:01 Dose:  0.5 mg


Ondansetron HCl (Zofran)  4 mg IVPUSH ONETIME ONE


   Stop: 10/02/19 21:38


   Last Admin: 10/02/19 22:05 Dose:  4 mg


Pantoprazole Sodium (Protonix Iv***)  40 mg IVPUSH ONETIME ONE


   Stop: 10/02/19 22:32


   Last Admin: 10/02/19 22:54 Dose:  40 mg


Pantoprazole Sodium (Protonix Iv***)  40 mg IV Q12H IES[facility]


   Last Admin: 10/02/19 23:21 Dose:  Not Given


Potassium Chloride (Klor-Con M20)  40 meq PO ONETIME ONE


   Stop: 10/03/19 05:09


   Last Admin: 10/03/19 05:25 Dose:  40 meq











- Exam


Quality Assessment: DVT Prophylaxis


General: Alert, Oriented, Cooperative, No Acute Distress


Lungs: Clear to Auscultation, Normal Respiratory Effort


Cardiovascular: Regular Rate, Regular Rhythm, No Murmurs


GI/Abdominal Exam: Soft, Non-Tender, No Organomegaly, No Distention


Extremities: Non-Tender, No Pedal Edema





- Problem List Review


Problem List Initiated/Reviewed/Updated: Yes





- My Orders


Last 24 Hours: 


My Active Orders





10/03/19 08:50


Blood Glucose Check, Bedside [RC] QIDACANDBED 


Communication Order [RC] STAT 


Diabetes Education [RC] Click to Edit 


Notify Provider [RC] PRN 


Dextrose 50% in Water   50 ml IV ONETIME PRN 


Dextrose [Glutose 15]   15 gm PO ONETIME PRN 





10/03/19 09:30


metFORMIN [Glucophage]   1,000 mg PO BIDMEALS 





10/03/19 11:00


Insulin Lispro [HumaLOG]   See Protocol  SUBCUT QIDACANDBED 





10/03/19 16:30


GLUCOSE POC LAB TO COLLECT [POC] QIDACANDBED 


Pantoprazole [ProTONIX***]   40 mg PO BIDAC 





10/03/19 17:00


BASIC METABOLIC PANEL,BMP [CHEM] Stat 


LACTIC ACID [CHEM] Stat 


MAGNESIUM [CHEM] Stat 





10/03/19 21:00


GLUCOSE POC LAB TO COLLECT [POC] QIDACANDBED 





10/03/19 Breakfast


Consistent Carbohydrate Diet [DIET] 





10/04/19 05:00


BASIC METABOLIC PANEL,BMP [CHEM] Timed 


CBC WITH AUTO DIFF [HEME] Timed 


LACTIC ACID [CHEM] Timed 


MAGNESIUM [CHEM] Timed 





10/04/19 07:30


GLUCOSE POC LAB TO COLLECT [POC] QIDACANDBED 





10/04/19 11:30


GLUCOSE POC LAB TO COLLECT [POC] QIDACANDBED 





10/04/19 16:30


GLUCOSE POC LAB TO COLLECT [POC] QIDACANDBED 





10/04/19 21:00


GLUCOSE POC LAB TO COLLECT [POC] QIDACANDBED 





10/05/19 07:30


GLUCOSE POC LAB TO COLLECT [POC] QIDACANDBED 





10/05/19 11:30


GLUCOSE POC LAB TO COLLECT [POC] QIDACANDBED 





10/05/19 16:30


GLUCOSE POC LAB TO COLLECT [POC] QIDACANDBED 





10/05/19 21:00


GLUCOSE POC LAB TO COLLECT [POC] QIDACANDBED 





10/06/19 07:30


GLUCOSE POC LAB TO COLLECT [POC] QIDACANDBED 





10/06/19 11:30


GLUCOSE POC LAB TO COLLECT [POC] QIDACANDBED 





10/06/19 16:30


GLUCOSE POC LAB TO COLLECT [POC] QIDACANDBED 





10/06/19 21:00


GLUCOSE POC LAB TO COLLECT [POC] QIDACANDBED 





10/07/19 07:30


GLUCOSE POC LAB TO COLLECT [POC] QIDACANDBED 





10/07/19 11:30


GLUCOSE POC LAB TO COLLECT [POC] QIDACANDBED 





10/07/19 16:30


GLUCOSE POC LAB TO COLLECT [POC] QIDACANDBED 





10/07/19 21:00


GLUCOSE POC LAB TO COLLECT [POC] QIDACANDBED 





10/08/19 07:30


GLUCOSE POC LAB TO COLLECT [POC] QIDACANDBED 














- Plan


Plan:: 





ASSESSMENT AND PLAN





DIABETIC KETOACIDOSIS-long-standing history of poorly controlled type 2 

diabetes mellitus. Developed nausea and vomiting with progressive weakness and 

underlying dehydration.


-Discontinue IV insulin


-Metformin 1000 mg by mouth twice daily


-4 times a day glucometers


-Moderate dose sliding scale Humalog


-Diabetes education


-Consider addition of long-acting insulin, depending on glucose levels over the 

next 24 hours





LACTIC ACIDOSIS-likely secondary to dehydration, no evidence of underlying 

infection or sepsis


-IV fluids


-Recheck lactic acid level later today and in a.m.





UNCONTROLLED TYPE 2 DIABETES MELLITUS-hemoglobin A1c elevated at 14.8


-Management as above





MAINTENANCE ISSUES


-DVT prophylaxis; Lovenox 40 mg subcutaneous daily


-GI prophylaxis; Protonix 40 mg twice daily


-Esteban catheter; not indicated


-Nutrition; consistent carb diet


-Nicotine dependence; not required





CODE STATUS-FULL CODE





ADMISSION STATUS-patient will be admitted to inpatient status, expect at least 

a 2 night hospital stay for evaluation and management of problems as outlined 

above.  At the time of this admission I do not reasonably expected evaluation 

and management of this problem will require more than a 96 hour hospital stay.





DISPOSITION-anticipate discharge to home after the hospital stay.





PRIMARY CARE PROVIDER-patient is from [facility] and receives her health 

care there

## 2019-10-04 RX ADMIN — ONDANSETRON PRN MG: 2 INJECTION, SOLUTION INTRAMUSCULAR; INTRAVENOUS at 08:41

## 2019-10-04 RX ADMIN — INSULIN LISPRO SCH UNITS: 100 INJECTION, SOLUTION INTRAVENOUS; SUBCUTANEOUS at 13:37

## 2019-10-04 RX ADMIN — INSULIN LISPRO SCH UNITS: 100 INJECTION, SOLUTION INTRAVENOUS; SUBCUTANEOUS at 21:37

## 2019-10-04 RX ADMIN — ONDANSETRON PRN MG: 2 INJECTION, SOLUTION INTRAMUSCULAR; INTRAVENOUS at 13:35

## 2019-10-04 RX ADMIN — INSULIN LISPRO SCH UNITS: 100 INJECTION, SOLUTION INTRAVENOUS; SUBCUTANEOUS at 02:03

## 2019-10-04 RX ADMIN — INSULIN GLARGINE SCH UNITS: 100 INJECTION, SOLUTION SUBCUTANEOUS at 21:38

## 2019-10-04 RX ADMIN — LORAZEPAM PRN MG: 2 INJECTION INTRAMUSCULAR; INTRAVENOUS at 09:04

## 2019-10-04 RX ADMIN — INSULIN LISPRO SCH UNITS: 100 INJECTION, SOLUTION INTRAVENOUS; SUBCUTANEOUS at 09:57

## 2019-10-04 RX ADMIN — INSULIN LISPRO SCH UNITS: 100 INJECTION, SOLUTION INTRAVENOUS; SUBCUTANEOUS at 05:46

## 2019-10-04 RX ADMIN — LORAZEPAM PRN MG: 2 INJECTION INTRAMUSCULAR; INTRAVENOUS at 17:02

## 2019-10-04 RX ADMIN — INSULIN LISPRO SCH UNITS: 100 INJECTION, SOLUTION INTRAVENOUS; SUBCUTANEOUS at 18:00

## 2019-10-04 RX ADMIN — LORAZEPAM PRN MG: 2 INJECTION INTRAMUSCULAR; INTRAVENOUS at 14:36

## 2019-10-04 NOTE — CRLCT
HISTORY:



Persistent abdominal pain with nausea.



TECHNIQUE:



Intravenous contrast enhanced CT of the abdomen and pelvis. 100 mL of 

Isovue-300 intravenous contrast was administered.



COMPARISON:



No prior.



FINDINGS:



Examination is mildly limited by motion artifact.



-



The liver size is mildly prominent. There is a calcification within the 

posterior segment right hepatic lobe which may relate to prior 

granulomatous infection. No biliary ductal dilatation. Gallbladder does not 

appear excessively distended. Calcified splenic granulomata are present. 

The adrenal glands are normal. There is no focal pancreatic abnormality. 

Symmetric nephrograms. No renal mass. No obstructive urinary calculus. The 

multiple pelvic calcifications are most likely represent phleboliths. 

Urinary bladder appears distended.



-



Large hiatal hernia. No small bowel obstruction. No abnormally dilated 

appendix. Colonic diverticulosis without acute diverticulitis. No abdominal 

aortic aneurysm.



-



Bilateral chronic pars defects at L5 with grade 2 anterolisthesis L5 on S1. 

Degenerative changes within the spine otherwise. Degenerative changes of 

the hips and sacroiliac joints.



-



Trace amount of pleural fluid bilaterally. Calcified granuloma right lower 

lobe. Lung parenchymal assessment limited by respiratory motion.



IMPRESSION:



1. No bowel obstruction.



2. Diverticulosis without diverticulitis.



3. Large hiatal hernia.



4. Atherosclerotic changes of the abdominal aorta without aneurysm.



5. Distended urinary bladder. No hydronephrosis or obstructive urinary 

calculus.



6. Trace pleural effusions.



7. Bilateral chronic pars defects at L5 with grade 2 anterolisthesis of L5 

on S1.



Dictated by Homero Gonsales MD @ 10/4/2019 11:27:43 AM



Please note that all CT scans at this facility use dose modulation, 

iterative reconstruction, and/or weight-based dosing when appropriate to 

reduce radiation dose to as low as reasonably achievable.



Dictated by: Homero Gonsales MD @ 10/04/2019 11:27:48



(Electronically Signed)

## 2019-10-04 NOTE — PCM.PN
- General Info


Date of Service: 10/04/19


Subjective Update: 





Ms. Nogueira is had ongoing difficulty with nausea and mild abdominal pain in the 

right abdomen. Ketoacidosis has resolved and blood sugars have come under 

better control. Oral intake has been negligible because of ongoing nausea.


Functional Status: Reports: Ambulating, Urinating.  Denies: Tolerating Diet





- Review of Systems


General: Reports: Weakness.  Denies: Fever, Chills


Pulmonary: Reports: No Symptoms


Cardiovascular: Reports: No Symptoms


Gastrointestinal: Reports: Abdominal Pain, Nausea.  Denies: Diarrhea, 

Difficulty Swallowing, Vomiting


Genitourinary: Reports: No Symptoms





- Patient Data


Vitals - Most Recent: 


 Last Vital Signs











Temp  97.7 F   10/04/19 08:00


 


Pulse  75   10/04/19 08:00


 


Resp  14   10/04/19 08:00


 


BP  130/62   10/04/19 08:00


 


Pulse Ox  99   10/04/19 08:00











Weight - Most Recent: 163 lb 15.995 oz


I&O - Last 24 Hours: 


 Intake & Output











 10/03/19 10/04/19 10/04/19





 22:59 06:59 14:59


 


Intake Total 289 1601 


 


Output Total 350 450 


 


Balance -61 1151 











Lab Results Last 24 Hours: 


 Laboratory Results - last 24 hr











  10/03/19 10/03/19 10/04/19 Range/Units





  16:51 16:51 05:30 


 


WBC    11.4 H  (4.5-11.0)  K/uL


 


RBC    3.13 L  (3.30-5.50)  M/uL


 


Hgb    9.8 L D  (12.0-15.0)  g/dL


 


Hct    29.8 L  (36.0-48.0)  %


 


MCV    95  (80-98)  fL


 


MCH    31  (27-31)  pg


 


MCHC    33  (32-36)  %


 


Plt Count    155  (150-400)  K/uL


 


Neut % (Auto)    71 H  (36-66)  %


 


Lymph % (Auto)    21 L  (24-44)  %


 


Mono % (Auto)    7 H  (2-6)  %


 


Eos % (Auto)    0 L  (2-4)  %


 


Baso % (Auto)    0  (0-1)  %


 


Sodium  139 L    (140-148)  mmol/L


 


Potassium  4.3    (3.6-5.2)  mmol/L


 


Chloride  108    (100-108)  mmol/L


 


Carbon Dioxide  17 L    (21-32)  mmol/L


 


Anion Gap  18.3 H    (5.0-14.0)  mmol/L


 


BUN  29 H    (7-18)  mg/dL


 


Creatinine  0.7    (0.6-1.0)  mg/dL


 


Est Cr Clr Drug Dosing  81.15    mL/min


 


Estimated GFR (MDRD)  > 60    (>60)  


 


Glucose  324 H    ()  mg/dL


 


Lactic Acid   1.6   (0.4-2.0)  mmol/L


 


Calcium  8.3 L    (8.5-10.1)  mg/dL


 


Magnesium  1.8    (1.8-2.4)  mg/dL














  10/04/19 10/04/19 Range/Units





  05:30 05:30 


 


WBC    (4.5-11.0)  K/uL


 


RBC    (3.30-5.50)  M/uL


 


Hgb    (12.0-15.0)  g/dL


 


Hct    (36.0-48.0)  %


 


MCV    (80-98)  fL


 


MCH    (27-31)  pg


 


MCHC    (32-36)  %


 


Plt Count    (150-400)  K/uL


 


Neut % (Auto)    (36-66)  %


 


Lymph % (Auto)    (24-44)  %


 


Mono % (Auto)    (2-6)  %


 


Eos % (Auto)    (2-4)  %


 


Baso % (Auto)    (0-1)  %


 


Sodium  143   (140-148)  mmol/L


 


Potassium  3.4 L   (3.6-5.2)  mmol/L


 


Chloride  113 H   (100-108)  mmol/L


 


Carbon Dioxide  21   (21-32)  mmol/L


 


Anion Gap  12.4   (5.0-14.0)  mmol/L


 


BUN  20 H   (7-18)  mg/dL


 


Creatinine  0.7   (0.6-1.0)  mg/dL


 


Est Cr Clr Drug Dosing  81.15   mL/min


 


Estimated GFR (MDRD)  > 60   (>60)  


 


Glucose  193 H   ()  mg/dL


 


Lactic Acid   1.1  (0.4-2.0)  mmol/L


 


Calcium  8.2 L   (8.5-10.1)  mg/dL


 


Magnesium  1.8   (1.8-2.4)  mg/dL











Med Orders - Current: 


 Current Medications





Dextrose (Glutose 15)  15 gm PO ONETIME PRN


   PRN Reason: Hypoglycemia


Dextrose/Water (Dextrose 50% In Water)  50 ml IV ONETIME PRN


   PRN Reason: Hypoglycemia


Enoxaparin Sodium (Lovenox)  40 mg SUBCUT DAILY [facility]


   Last Admin: 10/04/19 08:16 Dose:  40 mg


Sodium Chloride (Normal Saline)  1,000 mls @ 50 mls/hr IV ASDIRECTED [facility]


Insulin Glargine (Lantus Solostar)  20 units SUBCUT BEDTIME [facility]


   Last Admin: 10/03/19 21:47 Dose:  20 units


Insulin Human Lispro (Humalog)  0 unit SUBCUT Q4H [facility]; Protocol


   Last Admin: 10/04/19 09:57 Dose:  6 units


Lorazepam (Ativan)  0.5 mg IVPUSH Q2H PRN


   PRN Reason: Nausea


   Last Admin: 10/04/19 09:04 Dose:  0.5 mg


Ondansetron HCl (Zofran)  4 mg IVPUSH Q4H PRN


   PRN Reason: Nausea/Vomiting


   Last Admin: 10/04/19 08:41 Dose:  4 mg


Pantoprazole Sodium (Protonix***)  40 mg PO BIDAC [facility]


   Last Admin: 10/04/19 08:15 Dose:  40 mg





Discontinued Medications





Sodium Chloride (Normal Saline)  1,000 mls @ 999 mls/hr IV ASDIRECTED [facility]


   Last Admin: 10/03/19 04:02 Dose:  999 mls/hr


Insulin Human Regular 100 unit (/ Sodium Chloride)  100 mls @ 6.35 mls/hr IV 

TITRATE [facility]; Protocol


   Last Titration: 10/03/19 05:34 Dose:  Infused


Sodium Chloride (Normal Saline)  1,000 mls @ 999 mls/hr IV ASDIRECTED [facility]


   Last Admin: 10/02/19 23:06 Dose:  999 mls/hr


Dextrose/Sodium Chloride (Dextrose 5%-Normal Saline)  1,000 mls @ 100 mls/hr IV 

ASDIRECTED [facility]


   Last Admin: 10/03/19 15:04 Dose:  100 mls/hr


Sodium Chloride (Normal Saline)  1,000 mls @ 150 mls/hr IV ASDIRECTED [facility]


   Last Admin: 10/04/19 08:46 Dose:  150 mls/hr


Insulin Human Lispro (Humalog)  0 unit SUBCUT QIDACANDBED [facility]; Protocol


   Last Admin: 10/03/19 16:43 Dose:  6 units


Insulin Human Regular (Humulin R)  4 unit IVPUSH ONETIME ONE


   Stop: 10/02/19 22:12


   Last Admin: 10/02/19 22:38 Dose:  4 units


Lorazepam (Ativan)  0.5 mg IVPUSH ONETIME ONE


   Stop: 10/02/19 21:43


   Last Admin: 10/02/19 22:01 Dose:  0.5 mg


Lorazepam (Ativan)  0.5 mg IVPUSH ONETIME ONE


   Stop: 10/04/19 10:10


   Last Admin: 10/04/19 10:16 Dose:  0.5 mg


Metformin HCl (Glucophage)  1,000 mg PO BIDMEALS [facility]


   Last Admin: 10/04/19 08:16 Dose:  1,000 mg


Ondansetron HCl (Zofran)  4 mg IVPUSH ONETIME ONE


   Stop: 10/02/19 21:38


   Last Admin: 10/02/19 22:05 Dose:  4 mg


Pantoprazole Sodium (Protonix Iv***)  40 mg IVPUSH ONETIME ONE


   Stop: 10/02/19 22:32


   Last Admin: 10/02/19 22:54 Dose:  40 mg


Pantoprazole Sodium (Protonix Iv***)  40 mg IV Q12H [facility]


   Last Admin: 10/02/19 23:21 Dose:  Not Given


Potassium Chloride (Klor-Con M20)  40 meq PO ONETIME ONE


   Stop: 10/03/19 05:09


   Last Admin: 10/03/19 05:25 Dose:  40 meq


Potassium Chloride (Klor-Con M20)  40 meq PO ONETIME ONE


   Stop: 10/04/19 10:01











- Exam


Quality Assessment: DVT Prophylaxis


General: Alert, Cooperative, Moderate Distress


Lungs: Clear to Auscultation, Normal Respiratory Effort


Cardiovascular: Regular Rate, Regular Rhythm


GI/Abdominal Exam: Soft, No Organomegaly, Tender.  No: Distended, Guarding, 

Rigid, Rebound


Extremities: Non-Tender, Pedal Edema





- Problem List Review


Problem List Initiated/Reviewed/Updated: Yes





- My Orders


Last 24 Hours: 


My Active Orders





10/03/19 10:00


Enoxaparin [Lovenox]   40 mg SUBCUT DAILY 





10/03/19 10:58


Ondansetron [Zofran]   4 mg IVPUSH Q4H PRN 





10/03/19 12:50


LORazepam [Ativan]   0.5 mg IVPUSH Q2H PRN 





10/03/19 16:30


Pantoprazole [ProTONIX***]   40 mg PO BIDAC 





10/03/19 18:00


Insulin Lispro [HumaLOG]   See Protocol  SUBCUT Q4H 





10/03/19 21:00


Insulin Glarg,Human.Rec.Analog [LantUS Solostar]   20 units SUBCUT BEDTIME 





10/04/19 10:21


Abdomen Pelvis w Cont [CT] Stat 





10/04/19 10:30


Sodium Chloride 0.9% @  50 MLS/HR(1000ml) Sodium Chloride 0.9% [Normal Saline] 1

,000 ml IV ASDIRECTED 





10/05/19 02:15


GLUCOSE POC LAB TO COLLECT [POC] Q4H 





10/05/19 05:00


CBC WITH AUTO DIFF [HEME] Timed 


COMPREHENSIVE METABOLIC PN,CMP [CHEM] Timed 





10/05/19 06:15


GLUCOSE POC LAB TO COLLECT [POC] Q4H 





10/05/19 10:15


GLUCOSE POC LAB TO COLLECT [POC] Q4H 





10/05/19 14:15


GLUCOSE POC LAB TO COLLECT [POC] Q4H 





10/05/19 18:15


GLUCOSE POC LAB TO COLLECT [POC] Q4H 





10/05/19 22:15


GLUCOSE POC LAB TO COLLECT [POC] Q4H 





10/06/19 02:15


GLUCOSE POC LAB TO COLLECT [POC] Q4H 





10/06/19 06:15


GLUCOSE POC LAB TO COLLECT [POC] Q4H 





10/06/19 10:15


GLUCOSE POC LAB TO COLLECT [POC] Q4H 





10/06/19 14:15


GLUCOSE POC LAB TO COLLECT [POC] Q4H 





10/06/19 18:15


GLUCOSE POC LAB TO COLLECT [POC] Q4H 





10/06/19 22:15


GLUCOSE POC LAB TO COLLECT [POC] Q4H 














- Plan


Plan:: 





ASSESSMENT AND PLAN





DIABETIC KETOACIDOSIS-long-standing history of poorly controlled type 2 

diabetes mellitus. Soft with current management


-Lantus insulin 20 units subcutaneous daily at bedtime


-4 times a day glucometers


-Moderate dose sliding scale Humalog


-Diabetes education





LACTIC ACIDOSIS-resolved





UNCONTROLLED TYPE 2 DIABETES MELLITUS-hemoglobin A1c elevated at 14.8


-Management as above





MAINTENANCE ISSUES


-DVT prophylaxis; Lovenox 40 mg subcutaneous daily


-GI prophylaxis; Protonix 40 mg twice daily


-Esteban catheter; not indicated


-Nutrition; consistent carb diet


-Nicotine dependence; not required





CODE STATUS-FULL CODE





ADMISSION STATUS-patient will be admitted to inpatient status, expect at least 

a 2 night hospital stay for evaluation and management of problems as outlined 

above.  At the time of this admission I do not reasonably expected evaluation 

and management of this problem will require more than a 96 hour hospital stay.





DISPOSITION-anticipate discharge to home after the hospital stay.





PRIMARY CARE PROVIDER-patient is from [facility] and receives her health 

care there

## 2019-10-04 NOTE — CRLUS
INDICATION:



Right-sided abdominal pain



TECHNIQUE:



Multiple grayscale sonographic images of the right upper quadrant the 

abdomen.



COMPARISON:



CT abdomen pelvis with contrast 10/04/2019



FINDINGS:



The visualized pancreatic head and body demonstrate normal echotexture. The 

distal pancreas is partially obscured. There is no pancreatic ductal 

dilatation.



There is normal caliber of the visualized aorta. The intrahepatic IVC is 

patent. 



The visualized liver parenchyma demonstrates normal echotexture. No 

suspicious lesions are demonstrated. The main portal vein is patent. There 

is no intrahepatic biliary dilatation. The common bile duct is normal 

caliber, measuring up to 4 mm.



No stones or sludge are seen in the gallbladder lumen. There is normal 

thickness of the gallbladder wall, measuring less than 2 mm. There is no 

pericholecystic fluid. A 6 mm polyp is noted in the gallbladder fundus. 



The right kidney measures 11.5 cm in length and demonstrates normal 

parenchymal echotexture and cortical thickness. There is no hydronephrosis. 

IMPRESSION:



1. No cholelithiasis or findings of acute cholecystitis.



2. A gallbladder 6 mm polyp at the fundus. Follow-up ultrasound is 

recommended in 6 months to assess stability.



Dictated by Norma Thorne MD @ Oct  4 2019  4:45PM



Signed by Dr. Norma Thorne @ Oct  4 2019  4:54PM

## 2019-10-05 RX ADMIN — LORAZEPAM PRN MG: 2 INJECTION INTRAMUSCULAR; INTRAVENOUS at 05:37

## 2019-10-05 RX ADMIN — ONDANSETRON PRN MG: 2 INJECTION, SOLUTION INTRAMUSCULAR; INTRAVENOUS at 03:43

## 2019-10-05 RX ADMIN — INSULIN LISPRO SCH UNITS: 100 INJECTION, SOLUTION INTRAVENOUS; SUBCUTANEOUS at 05:32

## 2019-10-05 RX ADMIN — INSULIN LISPRO SCH: 100 INJECTION, SOLUTION INTRAVENOUS; SUBCUTANEOUS at 14:46

## 2019-10-05 RX ADMIN — INSULIN LISPRO SCH: 100 INJECTION, SOLUTION INTRAVENOUS; SUBCUTANEOUS at 10:00

## 2019-10-05 RX ADMIN — LORAZEPAM PRN MG: 2 INJECTION INTRAMUSCULAR; INTRAVENOUS at 14:21

## 2019-10-05 RX ADMIN — INSULIN LISPRO SCH UNITS: 100 INJECTION, SOLUTION INTRAVENOUS; SUBCUTANEOUS at 18:54

## 2019-10-05 RX ADMIN — LORAZEPAM PRN MG: 2 INJECTION INTRAMUSCULAR; INTRAVENOUS at 21:49

## 2019-10-05 RX ADMIN — INSULIN LISPRO SCH: 100 INJECTION, SOLUTION INTRAVENOUS; SUBCUTANEOUS at 02:04

## 2019-10-05 RX ADMIN — SUCRALFATE SCH GM: 1 SUSPENSION ORAL at 17:49

## 2019-10-05 RX ADMIN — Medication SCH CAP: at 09:24

## 2019-10-05 RX ADMIN — ONDANSETRON PRN MG: 2 INJECTION, SOLUTION INTRAMUSCULAR; INTRAVENOUS at 11:25

## 2019-10-05 RX ADMIN — INSULIN LISPRO SCH UNITS: 100 INJECTION, SOLUTION INTRAVENOUS; SUBCUTANEOUS at 21:40

## 2019-10-05 RX ADMIN — SUCRALFATE SCH GM: 1 SUSPENSION ORAL at 21:37

## 2019-10-05 RX ADMIN — ONDANSETRON PRN MG: 2 INJECTION, SOLUTION INTRAMUSCULAR; INTRAVENOUS at 19:48

## 2019-10-05 RX ADMIN — Medication SCH CAP: at 21:37

## 2019-10-05 NOTE — PCM.PN
- General Info


Date of Service: 10/05/19


Subjective Update: 





Ms. Nogueira is improved since yesterday with less nausea and over almost strength 

has improved. Esteban catheter was placed last night because of urinary retention 

and she would like it removed today. CT scan of the abdomen and pelvis showed 

no obvious abnormalities, other than a hiatal hernia and bladder distention. 

Ultrasound of the right upper quadrant of the abdomen showed no evidence of 

cholecystitis, gallbladder polyp was noted that will need to be followed up in 

6 months.


Functional Status: Reports: Tolerating Diet, Ambulating.  Denies: Urinating





- Review of Systems


General: Reports: Weakness.  Denies: Fever, Chills


Pulmonary: Reports: No Symptoms


Cardiovascular: Reports: No Symptoms


Gastrointestinal: Reports: No Symptoms





- Patient Data


Vitals - Most Recent: 


 Last Vital Signs











Temp  98.2 F   10/05/19 08:00


 


Pulse  65   10/05/19 08:00


 


Resp  12   10/05/19 08:00


 


BP  105/56 L  10/05/19 08:00


 


Pulse Ox  95   10/05/19 08:00











Weight - Most Recent: 163 lb 15.995 oz


I&O - Last 24 Hours: 


 Intake & Output











 10/04/19 10/05/19 10/05/19





 22:59 06:59 14:59


 


Intake Total 1304 557 


 


Output Total 2850 3225 


 


Balance -4803 -1479 











Lab Results Last 24 Hours: 


 Laboratory Results - last 24 hr











  10/04/19 10/05/19 10/05/19 Range/Units





  16:22 05:30 05:30 


 


WBC   8.1   (4.5-11.0)  K/uL


 


RBC   3.74   (3.30-5.50)  M/uL


 


Hgb   11.6 L   (12.0-15.0)  g/dL


 


Hct   34.6 L   (36.0-48.0)  %


 


MCV   93   (80-98)  fL


 


MCH   31   (27-31)  pg


 


MCHC   34   (32-36)  %


 


Plt Count   165   (150-400)  K/uL


 


Neut % (Auto)   72 H   (36-66)  %


 


Lymph % (Auto)   20 L   (24-44)  %


 


Mono % (Auto)   8 H   (2-6)  %


 


Eos % (Auto)   1 L   (2-4)  %


 


Baso % (Auto)   0   (0-1)  %


 


Sodium    138 L  (140-148)  mmol/L


 


Potassium    2.9 L*  (3.6-5.2)  mmol/L


 


Chloride    103  (100-108)  mmol/L


 


Carbon Dioxide    24  (21-32)  mmol/L


 


Anion Gap    13.9  (5.0-14.0)  mmol/L


 


BUN    5 L D  (7-18)  mg/dL


 


Creatinine    0.5 L  (0.6-1.0)  mg/dL


 


Est Cr Clr Drug Dosing    113.60  mL/min


 


Estimated GFR (MDRD)    > 60  (>60)  


 


Glucose    161 H  ()  mg/dL


 


Calcium    8.7  (8.5-10.1)  mg/dL


 


Total Bilirubin    0.5  (0.2-1.0)  mg/dL


 


AST    14 L  (15-37)  U/L


 


ALT    13  (12-78)  U/L


 


Alkaline Phosphatase    69  ()  U/L


 


Total Protein    5.9 L  (6.4-8.2)  g/dL


 


Albumin    2.6 L  (3.4-5.0)  g/dL


 


Globulin    3.3  (2.3-3.5)  g/dL


 


Albumin/Globulin Ratio    0.8 L  (1.2-2.2)  


 


Urine Color  Yellow    (YELLOW)  


 


Urine Appearance  Slightly cloudy A    (CLEAR)  


 


Urine pH  5.5    (5.0-8.0)  


 


Ur Specific Gravity  1.020    (1.008-1.030)  


 


Urine Protein  Negative    (NEGATIVE)  mg/dL


 


Urine Glucose (UA)  500 H    (NEGATIVE)  mg/dL


 


Urine Ketones  40 H    (NEGATIVE)  mg/dL


 


Urine Occult Blood  Small H    (NEGATIVE)  


 


Urine Nitrite  Negative    (NEGATIVE)  


 


Urine Bilirubin  Negative    (NEGATIVE)  


 


Urine Urobilinogen  0.2    (0.2-1.0)  EU/dL


 


Ur Leukocyte Esterase  Trace H    (NEGATIVE)  


 


Urine RBC  0-5    (0-5)  


 


Urine WBC  20-30 H    (0-5)  


 


Ur Epithelial Cells  Few    


 


Amorphous Sediment  Not seen    


 


Urine Bacteria  Many    


 


Urine Mucus  Few    











Med Orders - Current: 


 Current Medications





Dextrose (Glutose 15)  15 gm PO ONETIME PRN


   PRN Reason: Hypoglycemia


Dextrose/Water (Dextrose 50% In Water)  50 ml IV ONETIME PRN


   PRN Reason: Hypoglycemia


Enoxaparin Sodium (Lovenox)  40 mg SUBCUT DAILY [facility]


   Last Admin: 10/05/19 09:24 Dose:  40 mg


Potassium Chloride 20 meq/Lidocaine HCl 2 ml/ Sodium Chloride  112 mls @ 56 mls/

hr IV ONETIME ONE


   Stop: 10/05/19 10:29


   Last Admin: 10/05/19 09:06 Dose:  56 mls/hr


Ceftriaxone Sodium 1 gm/ (Sodium Chloride)  50 mls @ 100 mls/hr IV Q24H [facility]


   Last Admin: 10/05/19 08:40 Dose:  100 mls/hr


Insulin Glargine (Lantus Solostar)  20 units SUBCUT BEDTIME [facility]


   Last Admin: 10/04/19 21:38 Dose:  20 units


Insulin Human Lispro (Humalog)  0 unit SUBCUT Q4H [facility]; Protocol


   Last Admin: 10/05/19 05:32 Dose:  3 units


Iopamidol (Isovue-300 (61%))  100 ml IV .AS DIRECTED PRN


   PRN Reason: RADIOLOGY EXAM


   Stop: 10/05/19 10:32


   Last Admin: 10/04/19 10:52 Dose:  100 ml


Lactobacillus Rhamnosus (Culturelle)  1 cap PO BID [facility]


   Last Admin: 10/05/19 09:24 Dose:  1 cap


Lorazepam (Ativan)  0.5 mg IVPUSH Q2H PRN


   PRN Reason: Nausea


   Last Admin: 10/05/19 05:37 Dose:  0.5 mg


Ondansetron HCl (Zofran)  4 mg IVPUSH Q4H PRN


   PRN Reason: Nausea/Vomiting


   Last Admin: 10/05/19 03:43 Dose:  4 mg


Pantoprazole Sodium (Protonix***)  40 mg PO BIDAC [facility]


   Last Admin: 10/05/19 08:22 Dose:  Not Given


Tamsulosin HCl (Flomax)  0.4 mg PO PCBREAKFAST [facility]


   Last Admin: 10/05/19 09:54 Dose:  0.4 mg





Discontinued Medications





Sodium Chloride (Normal Saline)  1,000 mls @ 999 mls/hr IV ASDIRECTED [facility]


   Last Admin: 10/03/19 04:02 Dose:  999 mls/hr


Insulin Human Regular 100 unit (/ Sodium Chloride)  100 mls @ 6.35 mls/hr IV 

TITRATE [facility]; Protocol


   Last Titration: 10/03/19 05:34 Dose:  Infused


Sodium Chloride (Normal Saline)  1,000 mls @ 999 mls/hr IV ASDIRECTED [facility]


   Last Admin: 10/02/19 23:06 Dose:  999 mls/hr


Dextrose/Sodium Chloride (Dextrose 5%-Normal Saline)  1,000 mls @ 100 mls/hr IV 

ASDIRECTED [facility]


   Last Admin: 10/03/19 15:04 Dose:  100 mls/hr


Sodium Chloride (Normal Saline)  1,000 mls @ 150 mls/hr IV ASDIRECTED [facility]


   Last Admin: 10/04/19 08:46 Dose:  150 mls/hr


Sodium Chloride (Normal Saline)  1,000 mls @ 50 mls/hr IV ASDIRECTED [facility]


   Last Admin: 10/05/19 00:59 Dose:  50 mls/hr


Sodium Chloride (Normal Saline)  74 mls @ 3 mls/sec IV ASDIRECTED [facility]


   Stop: 10/04/19 10:46


   Last Admin: 10/04/19 10:53 Dose:  3 mls/sec


Potassium Chloride 20 meq/ (Premix)  0 mls @ 50 mls/hr IV Q2H [facility]


   Stop: 10/05/19 08:15


   Last Admin: 10/05/19 06:23 Dose:  50 mls/hr


Insulin Human Lispro (Humalog)  0 unit SUBCUT QIDACANDBED [facility]; Protocol


   Last Admin: 10/03/19 16:43 Dose:  6 units


Insulin Human Regular (Humulin R)  4 unit IVPUSH ONETIME ONE


   Stop: 10/02/19 22:12


   Last Admin: 10/02/19 22:38 Dose:  4 units


Lidocaine HCl (Xylocaine-Mpf 1%)  5 ml INJECT ONETIME ONE


   Stop: 10/05/19 06:09


   Last Admin: 10/05/19 06:23 Dose:  5 ml


Lorazepam (Ativan)  0.5 mg IVPUSH ONETIME ONE


   Stop: 10/02/19 21:43


   Last Admin: 10/02/19 22:01 Dose:  0.5 mg


Lorazepam (Ativan)  0.5 mg IVPUSH ONETIME ONE


   Stop: 10/04/19 10:10


   Last Admin: 10/04/19 10:16 Dose:  0.5 mg


Metformin HCl (Glucophage)  1,000 mg PO BIDMEALS [facility]


   Last Admin: 10/04/19 08:16 Dose:  1,000 mg


Ondansetron HCl (Zofran)  4 mg IVPUSH ONETIME ONE


   Stop: 10/02/19 21:38


   Last Admin: 10/02/19 22:05 Dose:  4 mg


Pantoprazole Sodium (Protonix Iv***)  40 mg IVPUSH ONETIME ONE


   Stop: 10/02/19 22:32


   Last Admin: 10/02/19 22:54 Dose:  40 mg


Pantoprazole Sodium (Protonix Iv***)  40 mg IV Q12H [facility]


   Last Admin: 10/02/19 23:21 Dose:  Not Given


Potassium Chloride (Klor-Con M20)  40 meq PO ONETIME ONE


   Stop: 10/03/19 05:09


   Last Admin: 10/03/19 05:25 Dose:  40 meq


Potassium Chloride (Klor-Con M20)  40 meq PO ONETIME ONE


   Stop: 10/04/19 10:01


   Last Admin: 10/04/19 11:14 Dose:  40 meq











- Exam


Quality Assessment: Urine Catheter, DVT Prophylaxis


General: Alert, Oriented, Cooperative, Mild Distress


Lungs: Clear to Auscultation, Normal Respiratory Effort


Cardiovascular: Regular Rate, Regular Rhythm, No Murmurs


GI/Abdominal Exam: Soft, Non-Tender, No Organomegaly, No Distention


Extremities: Non-Tender, No Pedal Edema





- Problem List Review


Problem List Initiated/Reviewed/Updated: Yes





- My Orders


Last 24 Hours: 


My Active Orders





10/04/19 10:31


Iopamidol [Isovue-300 (61%)]   100 ml IV .AS DIRECTED PRN 





10/04/19 16:24


CULTURE URINE [RM] Routine 





10/05/19 08:30


Potassium Chloride 20 meq Lidocaine 1% [Xylocaine 1%] 2 ml   Sodium Chloride 0.9

% [Normal Saline] 100 ml IV ONETIME 





10/05/19 09:00


Lactobacillus Rhamnosus GG [Culturelle]   1 cap PO BID 


Tamsulosin [Flomax]   0.4 mg PO PCBREAKFAST 


cefTRIAXone [Rocephin] 1 gm   Sodium Chloride 0.9% [Normal Saline] 50 ml IV 

Q24H 





10/05/19 09:51


Remove Esteban Catheter [Urinary Catheter Removal] [RC] Per Unit Routine 





10/05/19 09:52


Convert IV to Saline Lock [OM.PC] Routine 





10/05/19 Breakfast


Consistent Carbohydrate Diet [DIET] 





10/06/19 02:15


GLUCOSE POC LAB TO COLLECT [POC] Q4H 





10/06/19 06:15


GLUCOSE POC LAB TO COLLECT [POC] Q4H 





10/06/19 10:15


GLUCOSE POC LAB TO COLLECT [POC] Q4H 





10/06/19 14:15


GLUCOSE POC LAB TO COLLECT [POC] Q4H 





10/06/19 18:15


GLUCOSE POC LAB TO COLLECT [POC] Q4H 





10/06/19 22:15


GLUCOSE POC LAB TO COLLECT [POC] Q4H 














- Plan


Plan:: 





ASSESSMENT AND PLAN





DIABETIC KETOACIDOSIS-long-standing history of poorly controlled type 2 

diabetes mellitus. 


-Lantus insulin 20 units subcutaneous daily at bedtime


-4 times a day glucometers


-Moderate dose sliding scale Humalog


-Diabetes education


-Resume metformin 1 g by mouth twice daily





LACTIC ACIDOSIS-resolved





UNCONTROLLED TYPE 2 DIABETES MELLITUS-hemoglobin A1c elevated at 14.8


-Management as above





PERSISTENT NAUSEA-initially thought secondary to viral gastroenteritis. With 

persistent symptoms yesterday CT scan and ultrasound performed showing no 

obvious cause of ongoing nausea. She was feeling somewhat better this morning 

and refuses EGD.


-Continue Protonix 40 mg by mouth twice a day





MAINTENANCE ISSUES


-DVT prophylaxis; Lovenox 40 mg subcutaneous daily


-GI prophylaxis; Protonix 40 mg twice daily


-Esteban catheter; not indicated


-Nutrition; consistent carb diet


-Nicotine dependence; not required





CODE STATUS-FULL CODE





ADMISSION STATUS-patient will be admitted to inpatient status, expect at least 

a 2 night hospital stay for evaluation and management of problems as outlined 

above.  At the time of this admission I do not reasonably expected evaluation 

and management of this problem will require more than a 96 hour hospital stay.





DISPOSITION-anticipate discharge to home after the hospital stay.





PRIMARY CARE PROVIDER-patient is from [facility] and receives her health 

care there

## 2019-10-06 RX ADMIN — SODIUM CHLORIDE SCH MLS/HR: 9 INJECTION, SOLUTION INTRAVENOUS at 12:38

## 2019-10-06 RX ADMIN — Medication SCH CAP: at 08:40

## 2019-10-06 RX ADMIN — SUCRALFATE SCH GM: 1 SUSPENSION ORAL at 08:40

## 2019-10-06 RX ADMIN — INSULIN LISPRO SCH UNITS: 100 INJECTION, SOLUTION INTRAVENOUS; SUBCUTANEOUS at 12:25

## 2019-10-06 RX ADMIN — SUCRALFATE SCH GM: 1 SUSPENSION ORAL at 12:26

## 2019-10-06 RX ADMIN — SODIUM CHLORIDE SCH MLS/HR: 9 INJECTION, SOLUTION INTRAVENOUS at 10:35

## 2019-10-06 RX ADMIN — INSULIN LISPRO SCH: 100 INJECTION, SOLUTION INTRAVENOUS; SUBCUTANEOUS at 08:33

## 2019-10-06 NOTE — [id number].[id number]
**Discharge Summary





- Hospital Course


Brief History: Ms. Nogueira is a 64-year-old woman who was admitted through the 

emergency department with weakness, nausea, vomiting, secondary to diabetic 

ketoacidosis and marked hyperglycemia.





- Discharge Data


Discharge Date: 10/06/19


Discharge Disposition: Home, Self-Care 01


Condition: Fair





- Referral to Home Health


Primary Care Physician: 


PCP None








- Discharge Diagnosis/Problem(s)


(1) Type 2 diabetes mellitus


SNOMED Code(s): 25215469


   ICD Code: E11.9 - TYPE 2 DIABETES MELLITUS WITHOUT COMPLICATIONS   Status: 

Acute   Current Visit: Yes   





(2) Diabetic keto-acidosis


SNOMED Code(s): 748942121, 502222608


   ICD Code: E11.10 - TYPE 2 DIABETES MELLITUS WITH KETOACIDOSIS WITHOUT COMA   

Status: Acute   Current Visit: Yes   Onset Date: ~10/01/19   


Qualifiers: 


   Diabetes mellitus type: type 2   Diabetes mellitus complication detail: 

without coma   Qualified Code(s): E11.10 - Type 2 diabetes mellitus with 

ketoacidosis without coma   





(3) Dehydration


SNOMED Code(s): 70127425


   ICD Code: E86.0 - DEHYDRATION   Status: Acute   Current Visit: Yes   Onset 

Date: ~10/01/19   





(4) Renal insufficiency


SNOMED Code(s): 817451435, 737828766


   ICD Code: N28.9 - DISORDER OF KIDNEY AND URETER, UNSPECIFIED   Status: Acute

   Current Visit: Yes   Onset Date: ~10/01/19   





(5) Gastroenteritis


SNOMED Code(s): 79145009


   ICD Code: K52.9 - NONINFECTIVE GASTROENTERITIS AND COLITIS, UNSPECIFIED   

Status: Acute   Current Visit: Yes   





(6) Nausea and vomiting


SNOMED Code(s): 21016551


   ICD Code: R11.2 - NAUSEA WITH VOMITING, UNSPECIFIED   Status: Acute   

Current Visit: Yes   





- Patient Summary/Data


Hospital Course: 





Ms. Nogueira is a 64-year-old woman who was admitted through the emergency 

department by Dr. Garnett. She presented with nausea, vomiting, weakness, 

secondary to marked hyperglycemia and diabetic ketoacidosis. She has a long-

standing history of type 2 diabetes mellitus with poor control. Previously 

treated with metformin and insulin, which she has not taken for some time. HGB 

A1c was found to be markedly elevated at 14.8. On admission she was given 

vigorous IV fluid replacement for management of diabetic ketoacidosis and 

placed on a continuous infusion of regular insulin. By the following morning 

sugars have come under better control and ketoacidosis had essentially 

resolved. She was transitioned to oral therapy with metformin 1 g twice daily 

and long-acting insulin with Lantus 16 units at night. Blood sugars remained 

under fairly good control with this regimen through the rest of her hospital 

stay. Urinalysis was obtained and did suggest urinary tract infection. She was 

started on IV ceftriaxone after urine cultures have been obtained. Urine 

culture later grew out mixed francesco and it was felt that she did not have a true 

infection, antibiotic therapy was discontinued. She had persistent symptoms of 

nausea and vomiting despite management and control of her ketoacidosis as well 

as hyperglycemia. Was felt likely that she was experiencing a viral 

gastroenteritis. Because of associated abdominal pain further investigation was 

pursued. CT scan of the abdomen showed no significant abnormalities other than 

a polyp within the gallbladder, follow-up CT scan recommended in 6 months. 

Ultrasound was also obtained and showed no evidence of hepatic ductal 

dilatation or gallbladder wall thickening. We discussed proceeding with EGD for 

further evaluation of her symptoms which she refused. She was treated with IV 

Protonix as well as oral Carafate. By the morning of discharge she was feeling 

well with no further symptoms of nausea vomiting or abdominal pain. CT scan did 

note evidence of bladder distention, bladder scan showed significant urine 

within the bladder. Esteban catheter was placed but then removed the next day. 

She was started on Flomax and was able to urinate adequately prior to 

discharge. She tolerated a regular breakfast without significant difficulty. 

She will be discharged home with medications including metformin, Lantus insulin

, Flomax, Protonix, and potassium supplements. Activity will be as tolerated 

and she will resume a diabetic diet. I'll up appointment will be scheduled with 

her primary care provider within one week. She should have follow-up with 

diabetes educator as soon as possible.





- Patient Instructions


Diet: Diabetic Diet


Activity: As Tolerated


Other/Special Instructions: Please schedule follow-up appointment with primary 

care provider within one week. Please schedule follow-up appointment with 

diabetes educator as soon as possible.





- Discharge Plan


*PRESCRIPTION DRUG MONITORING PROGRAM REVIEWED*: Not Applicable


*COPY OF PRESCRIPTION DRUG MONITORING REPORT IN PATIENT [facility]APINCITO: Not Applicable


Prescriptions/Med Rec: 


Insulin Glarg,Human.Rec.Analog [Lantus Solostar] 16 units SUBCUT BEDTIME #2 pen


metFORMIN [Glucophage XR] 1,000 mg PO BIDMEALS #60 tab.er


Pantoprazole [ProTONIX***] 40 mg PO DAILY #30 tab.cr


Potassium Chloride 40 meq PO DAILY #60 tablet.er


Tamsulosin [Flomax] 0.4 mg PO PCBREAKFAST #30 cap.er


Home Medications: 


 Home Meds





Insulin Glarg,Human.Rec.Analog [Lantus Solostar] 16 units SUBCUT BEDTIME #2 pen 

10/06/19 [Rx]


Pantoprazole [ProTONIX***] 40 mg PO DAILY #30 tab.cr 10/06/19 [Rx]


Potassium Chloride 40 meq PO DAILY #60 tablet.er 10/06/19 [Rx]


Tamsulosin [Flomax] 0.4 mg PO PCBREAKFAST #30 cap.er 10/06/19 [Rx]


metFORMIN [Glucophage XR] 1,000 mg PO BIDMEALS #60 tab.er 10/06/19 [Rx]











- Discharge Summary/Plan Comment


DC Time >30 min.: No





- Patient Data


Vitals - Most Recent: 


 Last Vital Signs











Temp  97.2 F   10/06/19 11:28


 


Pulse  82   10/06/19 11:28


 


Resp  16   10/06/19 11:28


 


BP  109/54 L  10/06/19 11:28


 


Pulse Ox  99   10/06/19 11:28











Weight - Most Recent: 163 lb 15.995 oz


I&O - Last 24 hours: 


 Intake & Output











 10/05/19 10/06/19 10/06/19





 22:59 06:59 14:59


 


Output Total 150  650


 


Balance -150  -650











Lab Results - Last 24 hrs: 


 Laboratory Results - last 24 hr











  10/06/19 Range/Units





  08:39 


 


Sodium  138 L  (140-148)  mmol/L


 


Potassium  2.8 L*  (3.6-5.2)  mmol/L


 


Chloride  103  (100-108)  mmol/L


 


Carbon Dioxide  27  (21-32)  mmol/L


 


Anion Gap  10.8  (5.0-14.0)  mmol/L


 


BUN  9  D  (7-18)  mg/dL


 


Creatinine  0.6  (0.6-1.0)  mg/dL


 


Est Cr Clr Drug Dosing  94.67  mL/min


 


Estimated GFR (MDRD)  > 60  (>60)  


 


Glucose  132 H  ()  mg/dL


 


Calcium  8.8  (8.5-10.1)  mg/dL











DIONNE Results - Last 24 hrs: 


 Microbiology











 10/04/19 16:24 Urine Culture - Preliminary





 Urine, Clean Catch    MIXED POSITIVE FRANCESCO DAY 1











Med Orders - Current: 


 Current Medications





Dextrose (Glutose 15)  15 gm PO ONETIME PRN


   PRN Reason: Hypoglycemia


Dextrose/Water (Dextrose 50% In Water)  50 ml IV ONETIME PRN


   PRN Reason: Hypoglycemia


Enoxaparin Sodium (Lovenox)  40 mg SUBCUT DAILY [facility]


   Last Admin: 10/06/19 08:40 Dose:  Not Given


Ceftriaxone Sodium 1 gm/ (Sodium Chloride)  50 mls @ 100 mls/hr IV Q24H [facility]


   Last Admin: 10/06/19 09:16 Dose:  100 mls/hr


Potassium Chloride 20 meq/Lidocaine HCl 2 ml/ Sodium Chloride  112 mls @ 56 mls/

hr IV Q2H [facility]


   Stop: 10/06/19 13:59


   Last Admin: 10/06/19 10:35 Dose:  56 mls/hr


Insulin Glargine (Lantus Solostar)  12 units SUBCUT BEDTIME [facility]


   Last Admin: 10/05/19 21:38 Dose:  12 units


Insulin Human Lispro (Humalog)  0 unit SUBCUT QIDACANDBED [facility]; Protocol


   Last Admin: 10/06/19 08:33 Dose:  Not Given


Lactobacillus Rhamnosus (Culturelle)  1 cap PO BID [facility]


   Last Admin: 10/06/19 08:40 Dose:  1 cap


Lorazepam (Ativan)  0.5 mg IVPUSH Q2H PRN


   PRN Reason: Nausea


   Last Admin: 10/05/19 21:49 Dose:  0.5 mg


Metformin HCl (Glucophage)  1,000 mg PO BIDMEALS [facility]


   Last Admin: 10/06/19 08:41 Dose:  1,000 mg


Ondansetron HCl (Zofran)  4 mg IVPUSH Q4H PRN


   PRN Reason: Nausea/Vomiting


   Last Admin: 10/05/19 19:48 Dose:  4 mg


Pantoprazole Sodium (Protonix***)  40 mg PO BIDAC [facility]


   Last Admin: 10/06/19 08:40 Dose:  40 mg


Sucralfate (Carafate)  1 gm PO QIDACANDBED [facility]


   Last Admin: 10/06/19 08:40 Dose:  1 gm


Tamsulosin HCl (Flomax)  0.4 mg PO PCBREAKFAST [facility]


   Last Admin: 10/06/19 09:15 Dose:  0.4 mg





Discontinued Medications





Sodium Chloride (Normal Saline)  1,000 mls @ 999 mls/hr IV ASDIRECTED IESHA


   Last Admin: 10/03/19 04:02 Dose:  999 mls/hr


Insulin Human Regular 100 unit (/ Sodium Chloride)  100 mls @ 6.35 mls/hr IV 

TITRATE IESHA; Protocol


   Last Titration: 10/03/19 05:34 Dose:  Infused


Sodium Chloride (Normal Saline)  1,000 mls @ 999 mls/hr IV ASDIRECTED IESHA


   Last Admin: 10/02/19 23:06 Dose:  999 mls/hr


Dextrose/Sodium Chloride (Dextrose 5%-Normal Saline)  1,000 mls @ 100 mls/hr IV 

ASDIRECTED IESHA


   Last Admin: 10/03/19 15:04 Dose:  100 mls/hr


Sodium Chloride (Normal Saline)  1,000 mls @ 150 mls/hr IV ASDIRECTED IESHA


   Last Admin: 10/04/19 08:46 Dose:  150 mls/hr


Sodium Chloride (Normal Saline)  1,000 mls @ 50 mls/hr IV ASDIRECTED IESHA


   Last Admin: 10/05/19 00:59 Dose:  50 mls/hr


Sodium Chloride (Normal Saline)  74 mls @ 3 mls/sec IV ASDIRECTED IESHA


   Stop: 10/04/19 10:46


   Last Admin: 10/04/19 10:53 Dose:  3 mls/sec


Potassium Chloride 20 meq/ (Premix)  0 mls @ 50 mls/hr IV Q2H IESHA


   Stop: 10/05/19 08:15


   Last Admin: 10/05/19 06:23 Dose:  50 mls/hr


Potassium Chloride 20 meq/Lidocaine HCl 2 ml/ Sodium Chloride  112 mls @ 56 mls/

hr IV ONETIME ONE


   Stop: 10/05/19 10:29


   Last Admin: 10/05/19 09:06 Dose:  56 mls/hr


Insulin Glargine (Lantus Solostar)  20 units SUBCUT BEDTIME [facility]


   Last Admin: 10/04/19 21:38 Dose:  20 units


Insulin Human Lispro (Humalog)  0 unit SUBCUT QIDACANDBED [facility]; Protocol


   Last Admin: 10/03/19 16:43 Dose:  6 units


Insulin Human Lispro (Humalog)  0 unit SUBCUT Q4H [facility]; Protocol


   Last Admin: 10/05/19 14:46 Dose:  Not Given


Insulin Human Regular (Humulin R)  4 unit IVPUSH ONETIME ONE


   Stop: 10/02/19 22:12


   Last Admin: 10/02/19 22:38 Dose:  4 units


Iopamidol (Isovue-300 (61%))  100 ml IV .AS DIRECTED PRN


   PRN Reason: RADIOLOGY EXAM


   Stop: 10/05/19 10:32


   Last Admin: 10/04/19 10:52 Dose:  100 ml


Lidocaine HCl (Xylocaine-Mpf 1%)  5 ml INJECT ONETIME ONE


   Stop: 10/05/19 06:09


   Last Admin: 10/05/19 06:23 Dose:  5 ml


Lorazepam (Ativan)  0.5 mg IVPUSH ONETIME ONE


   Stop: 10/02/19 21:43


   Last Admin: 10/02/19 22:01 Dose:  0.5 mg


Lorazepam (Ativan)  0.5 mg IVPUSH ONETIME ONE


   Stop: 10/04/19 10:10


   Last Admin: 10/04/19 10:16 Dose:  0.5 mg


Metformin HCl (Glucophage)  1,000 mg PO BIDMEALS [facility]


   Last Admin: 10/04/19 08:16 Dose:  1,000 mg


Ondansetron HCl (Zofran)  4 mg IVPUSH ONETIME ONE


   Stop: 10/02/19 21:38


   Last Admin: 10/02/19 22:05 Dose:  4 mg


Pantoprazole Sodium (Protonix Iv***)  40 mg IVPUSH ONETIME ONE


   Stop: 10/02/19 22:32


   Last Admin: 10/02/19 22:54 Dose:  40 mg


Pantoprazole Sodium (Protonix Iv***)  40 mg IV Q12H [facility]


   Last Admin: 10/02/19 23:21 Dose:  Not Given


Potassium Chloride (Klor-Con M20)  40 meq PO ONETIME ONE


   Stop: 10/03/19 05:09


   Last Admin: 10/03/19 05:25 Dose:  40 meq


Potassium Chloride (Klor-Con M20)  40 meq PO ONETIME ONE


   Stop: 10/04/19 10:01


   Last Admin: 10/04/19 11:14 Dose:  40 meq


Potassium Chloride (Klor-Con M20)  40 meq PO ONETIME ONE


   Stop: 10/06/19 10:01


   Last Admin: 10/06/19 10:42 Dose:  40 meq











- Exam


Quality Assessment: Reports: DVT Prophylaxis


General: Reports: Alert, Oriented, Cooperative, No Acute Distress


Lungs: Reports: Clear to Auscultation, Normal Respiratory Effort


Cardiovascular: Reports: Regular Rate, Regular Rhythm, No Murmurs


GI/Abdominal Exam: Soft, Non-Tender, No Organomegaly, No Distention


Extremities: Non-Tender, No Pedal Edema